# Patient Record
Sex: FEMALE | Race: WHITE | Employment: OTHER | URBAN - NONMETROPOLITAN AREA
[De-identification: names, ages, dates, MRNs, and addresses within clinical notes are randomized per-mention and may not be internally consistent; named-entity substitution may affect disease eponyms.]

---

## 2017-04-17 LAB
CHOLESTEROL, TOTAL: 140 MG/DL
CHOLESTEROL/HDL RATIO: 3.3
HDLC SERPL-MCNC: 43 MG/DL (ref 35–70)
LDL CHOLESTEROL CALCULATED: 55.6 MG/DL (ref 0–160)
TRIGL SERPL-MCNC: 207 MG/DL
VLDLC SERPL CALC-MCNC: 41 MG/DL

## 2017-05-30 RX ORDER — SALSALATE 500 MG
500 TABLET ORAL 2 TIMES DAILY
Qty: 60 TABLET | Refills: 5 | Status: SHIPPED | OUTPATIENT
Start: 2017-05-30 | End: 2017-12-04 | Stop reason: SDUPTHER

## 2017-05-30 RX ORDER — SALSALATE 500 MG
500 TABLET ORAL 2 TIMES DAILY
COMMUNITY
End: 2017-05-30 | Stop reason: SDUPTHER

## 2017-07-24 LAB
AGE FOR GFR: 84
ANION GAP SERPL CALCULATED.3IONS-SCNC: 13 MMOL/L
CHLORIDE BLD-SCNC: 104 MMOL/L
CO2: 27 MMOL/L
CREAT SERPL-MCNC: 1 MG/DL
EGFR BF: 64 ML/MIN/1.73 M2
EGFR BM: 86 ML/MIN/1.73 M2
EGFR WF: 53 ML/MIN/1.73 M2
EGFR WM: 71 ML/MIN/1.73 M2
POTASSIUM SERPL-SCNC: 4.4 MMOL/L
SODIUM BLD-SCNC: 140 MMOL/L

## 2017-07-24 RX ORDER — LISINOPRIL 10 MG/1
10 TABLET ORAL DAILY
Qty: 30 TABLET | Refills: 5 | Status: SHIPPED | OUTPATIENT
Start: 2017-07-24 | End: 2017-10-24 | Stop reason: SDUPTHER

## 2017-07-24 RX ORDER — LISINOPRIL 10 MG/1
10 TABLET ORAL DAILY
COMMUNITY
End: 2017-07-24 | Stop reason: SDUPTHER

## 2017-07-25 LAB — TSH SERPL DL<=0.05 MIU/L-ACNC: 0.19 MIU/ML

## 2017-07-31 RX ORDER — SIMVASTATIN 40 MG
40 TABLET ORAL NIGHTLY
Qty: 90 TABLET | Refills: 3 | Status: SHIPPED | OUTPATIENT
Start: 2017-07-31 | End: 2018-07-28 | Stop reason: SDUPTHER

## 2017-09-12 RX ORDER — OMEPRAZOLE 20 MG/1
20 CAPSULE, DELAYED RELEASE ORAL DAILY
COMMUNITY
End: 2017-09-12 | Stop reason: SDUPTHER

## 2017-09-12 RX ORDER — OMEPRAZOLE 20 MG/1
20 CAPSULE, DELAYED RELEASE ORAL DAILY
Qty: 90 CAPSULE | Refills: 1 | Status: SHIPPED | OUTPATIENT
Start: 2017-09-12 | End: 2018-03-11 | Stop reason: SDUPTHER

## 2017-09-22 PROBLEM — C91.10 CLL (CHRONIC LYMPHOCYTIC LEUKEMIA) (HCC): Status: ACTIVE | Noted: 2017-09-22

## 2017-09-22 PROBLEM — D80.1 HYPOGAMMAGLOBULINEMIA (HCC): Status: ACTIVE | Noted: 2017-09-22

## 2017-10-17 RX ORDER — LEVOTHYROXINE SODIUM 88 UG/1
88 TABLET ORAL DAILY
COMMUNITY
End: 2017-10-17 | Stop reason: SDUPTHER

## 2017-10-17 RX ORDER — LEVOTHYROXINE SODIUM 88 UG/1
88 TABLET ORAL DAILY
Qty: 90 TABLET | Refills: 0 | Status: SHIPPED | OUTPATIENT
Start: 2017-10-17 | End: 2018-01-10 | Stop reason: SDUPTHER

## 2017-10-17 NOTE — TELEPHONE ENCOUNTER
Mike Serrano is calling to request a refill on the following medication(s):  Requested Prescriptions     Pending Prescriptions Disp Refills    levothyroxine (SYNTHROID) 88 MCG tablet 90 tablet 1     Sig: Take 1 tablet by mouth Daily       Last Visit Date (If Applicable):  Visit date not found    Next Visit Date:    10/24/2017

## 2017-10-18 VITALS
BODY MASS INDEX: 31.07 KG/M2 | SYSTOLIC BLOOD PRESSURE: 126 MMHG | HEART RATE: 88 BPM | WEIGHT: 148 LBS | HEIGHT: 58 IN | DIASTOLIC BLOOD PRESSURE: 64 MMHG

## 2017-10-18 DIAGNOSIS — I70.90 ATHEROSCLEROSIS: ICD-10-CM

## 2017-10-18 DIAGNOSIS — E78.6: ICD-10-CM

## 2017-10-18 DIAGNOSIS — E78.49 OTHER HYPERLIPIDEMIA: ICD-10-CM

## 2017-10-18 DIAGNOSIS — I10 BENIGN ESSENTIAL HYPERTENSION: ICD-10-CM

## 2017-10-18 PROBLEM — K57.92 DIVERTICULITIS: Status: ACTIVE | Noted: 2017-10-18

## 2017-10-18 PROBLEM — M81.0 OSTEOPOROSIS: Status: ACTIVE | Noted: 2017-10-18

## 2017-10-18 PROBLEM — E03.9 HYPOTHYROIDISM: Status: ACTIVE | Noted: 2017-10-18

## 2017-10-18 PROBLEM — I65.29 CAROTID STENOSIS: Status: ACTIVE | Noted: 2017-10-18

## 2017-10-18 RX ORDER — SERTRALINE HYDROCHLORIDE 25 MG/1
25 TABLET, FILM COATED ORAL DAILY
COMMUNITY
End: 2021-03-03

## 2017-10-18 RX ORDER — DOCUSATE SODIUM 100 MG/1
100 CAPSULE, LIQUID FILLED ORAL DAILY
COMMUNITY

## 2017-10-18 RX ORDER — FERROUS SULFATE 325(65) MG
325 TABLET ORAL 2 TIMES DAILY
COMMUNITY
End: 2018-03-05 | Stop reason: SDUPTHER

## 2017-10-20 LAB
A/G RATIO: 1.2 RATIO
AGE FOR GFR: 84
ALBUMIN: 4.4 G/DL
ALK PHOSPHATASE: 66 UNITS/L
ALT SERPL-CCNC: 31 UNITS/L
ANION GAP SERPL CALCULATED.3IONS-SCNC: 15 MMOL/L
AST SERPL-CCNC: 26 UNITS/L
BILIRUB SERPL-MCNC: 0.4 MG/DL
BILIRUBIN DIRECT: 0 MG/DL
CHLORIDE BLD-SCNC: 104 MMOL/L
CHOLESTEROL/HDL RATIO: 3.7 RATIO
CHOLESTEROL: 140 MG/DL
CO2: 28 MMOL/L
CREAT SERPL-MCNC: 1 MG/DL
EGFR BF: 64 ML/MIN/1.73 M2
EGFR BM: 86 ML/MIN/1.73 M2
EGFR WF: 53 ML/MIN/1.73 M2
EGFR WM: 71 ML/MIN/1.73 M2
GLOBULIN: 3.7 G/DL
HDL, DIRECT: 38 MG/DL
LDL CHOLESTEROL CALCULATED: 59.8 MG/DL
POTASSIUM SERPL-SCNC: 3.8 MMOL/L
SODIUM BLD-SCNC: 143 MMOL/L
TOTAL PROTEIN: 8.1 G/DL
TRIGL SERPL-MCNC: 211 MG/DL
TSH SERPL DL<=0.05 MIU/L-ACNC: 0.71 MIU/ML
VLDLC SERPL CALC-MCNC: 42 MG/DL

## 2017-10-24 ENCOUNTER — OFFICE VISIT (OUTPATIENT)
Dept: FAMILY MEDICINE CLINIC | Age: 82
End: 2017-10-24
Payer: MEDICARE

## 2017-10-24 VITALS
WEIGHT: 160 LBS | SYSTOLIC BLOOD PRESSURE: 128 MMHG | HEART RATE: 64 BPM | BODY MASS INDEX: 33.58 KG/M2 | HEIGHT: 58 IN | DIASTOLIC BLOOD PRESSURE: 62 MMHG

## 2017-10-24 DIAGNOSIS — Z23 NEED FOR PROPHYLACTIC VACCINATION AND INOCULATION AGAINST INFLUENZA: ICD-10-CM

## 2017-10-24 DIAGNOSIS — I10 ESSENTIAL HYPERTENSION: Primary | ICD-10-CM

## 2017-10-24 DIAGNOSIS — C91.10 CLL (CHRONIC LYMPHOCYTIC LEUKEMIA) (HCC): ICD-10-CM

## 2017-10-24 PROCEDURE — 90662 IIV NO PRSV INCREASED AG IM: CPT | Performed by: FAMILY MEDICINE

## 2017-10-24 PROCEDURE — G8484 FLU IMMUNIZE NO ADMIN: HCPCS | Performed by: FAMILY MEDICINE

## 2017-10-24 PROCEDURE — G8400 PT W/DXA NO RESULTS DOC: HCPCS | Performed by: FAMILY MEDICINE

## 2017-10-24 PROCEDURE — G0008 ADMIN INFLUENZA VIRUS VAC: HCPCS | Performed by: FAMILY MEDICINE

## 2017-10-24 PROCEDURE — 1036F TOBACCO NON-USER: CPT | Performed by: FAMILY MEDICINE

## 2017-10-24 PROCEDURE — G8417 CALC BMI ABV UP PARAM F/U: HCPCS | Performed by: FAMILY MEDICINE

## 2017-10-24 PROCEDURE — G8599 NO ASA/ANTIPLAT THER USE RNG: HCPCS | Performed by: FAMILY MEDICINE

## 2017-10-24 PROCEDURE — 1123F ACP DISCUSS/DSCN MKR DOCD: CPT | Performed by: FAMILY MEDICINE

## 2017-10-24 PROCEDURE — 4040F PNEUMOC VAC/ADMIN/RCVD: CPT | Performed by: FAMILY MEDICINE

## 2017-10-24 PROCEDURE — 1090F PRES/ABSN URINE INCON ASSESS: CPT | Performed by: FAMILY MEDICINE

## 2017-10-24 PROCEDURE — 99213 OFFICE O/P EST LOW 20 MIN: CPT | Performed by: FAMILY MEDICINE

## 2017-10-24 PROCEDURE — G8427 DOCREV CUR MEDS BY ELIG CLIN: HCPCS | Performed by: FAMILY MEDICINE

## 2017-10-24 RX ORDER — LISINOPRIL 10 MG/1
5 TABLET ORAL DAILY
Qty: 30 TABLET | Refills: 0
Start: 2017-10-24 | End: 2018-02-01 | Stop reason: SDUPTHER

## 2017-10-24 ASSESSMENT — ENCOUNTER SYMPTOMS
BACK PAIN: 1
SHORTNESS OF BREATH: 0

## 2017-10-24 NOTE — PROGRESS NOTES
influenza    2. Essential hypertension    3. CLL (chronic lymphocytic leukemia) ([facility] [address]                     Plan:     There are no Patient Instructions on file for this visit. Orders Placed This Encounter   Procedures    INFLUENZA, HIGH DOSE, 65 YRS +, IM, PF, PREFILL SYR, 0.5ML (FLUZONE HD)     No orders of the defined types were placed in this encounter. May use either 2-3 tabs of disalcid daily as needed    Return in about 6 months (around 4/24/2018) for HTN. Discussed use, benefit, and side effects of prescribed medications. All patient questions answered. Pt voiced understanding. Instructed to continue current medications, diet and exercise. Patient agreed with treatment plan. Follow up as directed.      Electronically signed by Albertina Peralta MD on 10/24/2017

## 2017-12-04 RX ORDER — SALSALATE 500 MG
500 TABLET ORAL 2 TIMES DAILY
Qty: 60 TABLET | Refills: 5 | Status: SHIPPED | OUTPATIENT
Start: 2017-12-04 | End: 2018-06-04 | Stop reason: SDUPTHER

## 2017-12-13 ENCOUNTER — OFFICE VISIT (OUTPATIENT)
Dept: FAMILY MEDICINE CLINIC | Age: 82
End: 2017-12-13
Payer: MEDICARE

## 2017-12-13 VITALS
DIASTOLIC BLOOD PRESSURE: 70 MMHG | WEIGHT: 156 LBS | HEART RATE: 80 BPM | BODY MASS INDEX: 32.89 KG/M2 | SYSTOLIC BLOOD PRESSURE: 130 MMHG

## 2017-12-13 DIAGNOSIS — M54.50 ACUTE RIGHT-SIDED LOW BACK PAIN WITHOUT SCIATICA: Primary | ICD-10-CM

## 2017-12-13 PROCEDURE — 4040F PNEUMOC VAC/ADMIN/RCVD: CPT | Performed by: FAMILY MEDICINE

## 2017-12-13 PROCEDURE — 1090F PRES/ABSN URINE INCON ASSESS: CPT | Performed by: FAMILY MEDICINE

## 2017-12-13 PROCEDURE — G8427 DOCREV CUR MEDS BY ELIG CLIN: HCPCS | Performed by: FAMILY MEDICINE

## 2017-12-13 PROCEDURE — G8484 FLU IMMUNIZE NO ADMIN: HCPCS | Performed by: FAMILY MEDICINE

## 2017-12-13 PROCEDURE — 1123F ACP DISCUSS/DSCN MKR DOCD: CPT | Performed by: FAMILY MEDICINE

## 2017-12-13 PROCEDURE — G8599 NO ASA/ANTIPLAT THER USE RNG: HCPCS | Performed by: FAMILY MEDICINE

## 2017-12-13 PROCEDURE — 1036F TOBACCO NON-USER: CPT | Performed by: FAMILY MEDICINE

## 2017-12-13 PROCEDURE — G8417 CALC BMI ABV UP PARAM F/U: HCPCS | Performed by: FAMILY MEDICINE

## 2017-12-13 PROCEDURE — 99213 OFFICE O/P EST LOW 20 MIN: CPT | Performed by: FAMILY MEDICINE

## 2017-12-13 PROCEDURE — G8400 PT W/DXA NO RESULTS DOC: HCPCS | Performed by: FAMILY MEDICINE

## 2017-12-13 ASSESSMENT — PATIENT HEALTH QUESTIONNAIRE - PHQ9
1. LITTLE INTEREST OR PLEASURE IN DOING THINGS: 0
2. FEELING DOWN, DEPRESSED OR HOPELESS: 0
SUM OF ALL RESPONSES TO PHQ9 QUESTIONS 1 & 2: 0
SUM OF ALL RESPONSES TO PHQ QUESTIONS 1-9: 0

## 2017-12-13 ASSESSMENT — ENCOUNTER SYMPTOMS: BACK PAIN: 1

## 2017-12-13 NOTE — PROGRESS NOTES
mouth nightly 90 tablet 3     No current facility-administered medications for this visit. No Known Allergies    Health Maintenance   Topic Date Due    DTaP/Tdap/Td vaccine (1 - Tdap) 06/08/1952    DEXA (modify frequency per FRAX score)  Completed    Flu vaccine  Completed    Pneumococcal high/highest risk  Completed       Subjective:      Review of Systems   Musculoskeletal: Positive for back pain (rt side low back pain, fell last week of September and when she fell she hit on her right side spilling a cigarette trash can over  has had pain in back since then worse past couple weeks). Neurological: Negative for weakness and numbness. taking OTC back and body and salsalate tid  Didn't tell anyone she fell when it happened    Objective:     /70   Pulse 80   Wt 156 lb (70.8 kg)   BMI 32.89 kg/m²     Physical Exam   Constitutional: She is oriented to person, place, and time. She appears well-developed and well-nourished. No distress. Cardiovascular: Normal rate. No murmur heard. Pulmonary/Chest: Effort normal. She has no wheezes. Musculoskeletal: She exhibits no edema. Cervical back: Normal.        Thoracic back: Normal.        Lumbar back: She exhibits tenderness (minimal bilateral tenderness paravertebral). Neurological: She is alert and oriented to person, place, and time. No sciatic notch tenderness       Assessment:     1. Acute right-sided low back pain without sciatica      No results found for this visit on 12/13/17.         Plan:     Orders Placed This Encounter   Procedures    XR THORACIC SPINE (3 VIEWS)     Standing Status:   Future     Standing Expiration Date:   12/13/2018     Order Specific Question:   Reason for exam:     Answer:   right side back pain    XR LUMBAR SPINE (2-3 VIEWS)     Standing Status:   Future     Standing Expiration Date:   12/13/2018     Order Specific Question:   Reason for exam:     Answer:   right side mid to low back pain   [facility] - [place]     Referral Priority:   Routine     Referral Type:   Eval and Treat     Referral Reason:   Specialty Services Required     Referral Location:   [address] [place] [address] Specialty:   Physical Therapy     Number of Visits Requested:   1       No orders of the defined types were placed in this encounter. Advised to contact sooner if another fall occurs  No Follow-up on file. Discussed use, benefit, and side effects of prescribed medications. All patient questions answered. Pt voiced understanding. Instructed to continue current medications, diet and exercise. Patient agreed with treatment plan. Follow up as directed.      Electronically signed by Phuong Lei MD on 12/13/2017

## 2018-01-10 RX ORDER — LEVOTHYROXINE SODIUM 88 UG/1
TABLET ORAL
Qty: 30 TABLET | Refills: 0 | Status: SHIPPED | OUTPATIENT
Start: 2018-01-10 | End: 2018-02-12 | Stop reason: SDUPTHER

## 2018-02-01 DIAGNOSIS — C91.10 CLL (CHRONIC LYMPHOCYTIC LEUKEMIA) (HCC): ICD-10-CM

## 2018-02-01 DIAGNOSIS — E03.9 ACQUIRED HYPOTHYROIDISM: ICD-10-CM

## 2018-02-01 DIAGNOSIS — I10 ESSENTIAL HYPERTENSION: Primary | ICD-10-CM

## 2018-02-01 RX ORDER — LISINOPRIL 5 MG/1
5 TABLET ORAL DAILY
Qty: 90 TABLET | Refills: 1 | Status: SHIPPED | OUTPATIENT
Start: 2018-02-01 | End: 2018-07-27 | Stop reason: SDUPTHER

## 2018-02-12 RX ORDER — LEVOTHYROXINE SODIUM 88 UG/1
TABLET ORAL
Qty: 30 TABLET | Refills: 5 | Status: SHIPPED | OUTPATIENT
Start: 2018-02-12 | End: 2018-04-24 | Stop reason: SDUPTHER

## 2018-03-05 RX ORDER — FERROUS SULFATE 325(65) MG
325 TABLET ORAL 2 TIMES DAILY
Qty: 30 TABLET | Refills: 5 | Status: SHIPPED | OUTPATIENT
Start: 2018-03-05 | End: 2019-12-17 | Stop reason: SDUPTHER

## 2018-03-05 NOTE — TELEPHONE ENCOUNTER
[employer] is calling to request a refill on the following medication(s):  Requested Prescriptions     Pending Prescriptions Disp Refills    ferrous sulfate 325 (65 Fe) MG tablet 30 tablet 5     Sig: Take 1 tablet by mouth 2 times daily       Last Visit Date (If Applicable):  03/14/2474    Next Visit Date:    4/24/2018

## 2018-03-12 RX ORDER — OMEPRAZOLE 20 MG/1
CAPSULE, DELAYED RELEASE ORAL
Qty: 90 CAPSULE | Refills: 1 | Status: SHIPPED | OUTPATIENT
Start: 2018-03-12 | End: 2018-09-08 | Stop reason: SDUPTHER

## 2018-04-09 ENCOUNTER — OFFICE VISIT (OUTPATIENT)
Dept: FAMILY MEDICINE CLINIC | Age: 83
End: 2018-04-09
Payer: MEDICARE

## 2018-04-09 VITALS
BODY MASS INDEX: 33.52 KG/M2 | HEART RATE: 72 BPM | SYSTOLIC BLOOD PRESSURE: 180 MMHG | DIASTOLIC BLOOD PRESSURE: 80 MMHG | TEMPERATURE: 97.9 F | WEIGHT: 159 LBS

## 2018-04-09 DIAGNOSIS — R05.9 COUGH: Primary | ICD-10-CM

## 2018-04-09 DIAGNOSIS — D80.1 HYPOGAMMAGLOBULINEMIA (HCC): ICD-10-CM

## 2018-04-09 PROCEDURE — G8417 CALC BMI ABV UP PARAM F/U: HCPCS | Performed by: FAMILY MEDICINE

## 2018-04-09 PROCEDURE — 1036F TOBACCO NON-USER: CPT | Performed by: FAMILY MEDICINE

## 2018-04-09 PROCEDURE — 4040F PNEUMOC VAC/ADMIN/RCVD: CPT | Performed by: FAMILY MEDICINE

## 2018-04-09 PROCEDURE — 1090F PRES/ABSN URINE INCON ASSESS: CPT | Performed by: FAMILY MEDICINE

## 2018-04-09 PROCEDURE — 1123F ACP DISCUSS/DSCN MKR DOCD: CPT | Performed by: FAMILY MEDICINE

## 2018-04-09 PROCEDURE — 99213 OFFICE O/P EST LOW 20 MIN: CPT | Performed by: FAMILY MEDICINE

## 2018-04-09 PROCEDURE — G8400 PT W/DXA NO RESULTS DOC: HCPCS | Performed by: FAMILY MEDICINE

## 2018-04-09 PROCEDURE — G8427 DOCREV CUR MEDS BY ELIG CLIN: HCPCS | Performed by: FAMILY MEDICINE

## 2018-04-09 PROCEDURE — G8599 NO ASA/ANTIPLAT THER USE RNG: HCPCS | Performed by: FAMILY MEDICINE

## 2018-04-09 RX ORDER — BENZONATATE 100 MG/1
100 CAPSULE ORAL 3 TIMES DAILY PRN
Qty: 30 CAPSULE | Refills: 0 | Status: SHIPPED | OUTPATIENT
Start: 2018-04-09 | End: 2018-04-16

## 2018-04-09 RX ORDER — AZITHROMYCIN 250 MG/1
TABLET, FILM COATED ORAL
Qty: 1 PACKET | Refills: 0 | Status: SHIPPED | OUTPATIENT
Start: 2018-04-09 | End: 2018-04-19

## 2018-04-09 ASSESSMENT — ENCOUNTER SYMPTOMS
SHORTNESS OF BREATH: 1
WHEEZING: 1
COUGH: 1

## 2018-04-21 ENCOUNTER — TELEPHONE (OUTPATIENT)
Dept: FAMILY MEDICINE CLINIC | Age: 83
End: 2018-04-21

## 2018-04-21 LAB
AGE FOR GFR: 84
ANION GAP SERPL CALCULATED.3IONS-SCNC: 18 MMOL/L
ANISOCYTOSIS: ABNORMAL
BANDS: 0 %
BASOPHILS # BLD: 0 %
CHLORIDE BLD-SCNC: 102 MMOL/L
CO2: 28 MMOL/L
CREAT SERPL-MCNC: 1 MG/DL
DIFFERENTIAL: MANUAL DIFF
EGFR BF: 64 ML/MIN/1.73 M2
EGFR BM: 86 ML/MIN/1.73 M2
EGFR WF: 53 ML/MIN/1.73 M2
EGFR WM: 71 ML/MIN/1.73 M2
EOSINOPHIL # BLD: 2 %
HCT VFR BLD CALC: 38.9 %
HEMOGLOBIN: 13.2 G/DL
LYMPHOCYTES # BLD: 65 %
LYMPHOCYTES VARIANT: 6 %
MCH RBC QN AUTO: 30.6 PG
MCHC RBC AUTO-ENTMCNC: 34 G/DL
MCV RBC AUTO: 90.2 FL
MONOCYTES: 4 %
MORPHOLOGY: ABNORMAL
NEUTROPHILS: 23 %
PDW BLD-RTO: 12.1 %
PLATELET # BLD: 172 THOU/MM3
PMV BLD AUTO: 6.9 FL
POTASSIUM SERPL-SCNC: 5.3 MMOL/L
RBC # BLD: 4.31 M/UL
SODIUM BLD-SCNC: 143 MMOL/L
TSH SERPL DL<=0.05 MIU/L-ACNC: 2.54 MIU/ML
WBC # BLD: 23.2 THOU/ML3

## 2018-04-24 ENCOUNTER — OFFICE VISIT (OUTPATIENT)
Dept: FAMILY MEDICINE CLINIC | Age: 83
End: 2018-04-24
Payer: MEDICARE

## 2018-04-24 VITALS
BODY MASS INDEX: 33.37 KG/M2 | DIASTOLIC BLOOD PRESSURE: 68 MMHG | HEIGHT: 58 IN | WEIGHT: 159 LBS | SYSTOLIC BLOOD PRESSURE: 132 MMHG | HEART RATE: 80 BPM

## 2018-04-24 DIAGNOSIS — C91.10 CLL (CHRONIC LYMPHOCYTIC LEUKEMIA) (HCC): ICD-10-CM

## 2018-04-24 DIAGNOSIS — E03.9 ACQUIRED HYPOTHYROIDISM: Primary | ICD-10-CM

## 2018-04-24 DIAGNOSIS — Z23 NEED FOR PROPHYLACTIC VACCINATION AND INOCULATION AGAINST VARICELLA: ICD-10-CM

## 2018-04-24 DIAGNOSIS — Z91.81 AT HIGH RISK FOR FALLS: ICD-10-CM

## 2018-04-24 DIAGNOSIS — I10 ESSENTIAL HYPERTENSION: ICD-10-CM

## 2018-04-24 PROCEDURE — 1090F PRES/ABSN URINE INCON ASSESS: CPT | Performed by: FAMILY MEDICINE

## 2018-04-24 PROCEDURE — 99214 OFFICE O/P EST MOD 30 MIN: CPT | Performed by: FAMILY MEDICINE

## 2018-04-24 PROCEDURE — G8417 CALC BMI ABV UP PARAM F/U: HCPCS | Performed by: FAMILY MEDICINE

## 2018-04-24 PROCEDURE — 1036F TOBACCO NON-USER: CPT | Performed by: FAMILY MEDICINE

## 2018-04-24 PROCEDURE — G8599 NO ASA/ANTIPLAT THER USE RNG: HCPCS | Performed by: FAMILY MEDICINE

## 2018-04-24 PROCEDURE — 4040F PNEUMOC VAC/ADMIN/RCVD: CPT | Performed by: FAMILY MEDICINE

## 2018-04-24 PROCEDURE — G8400 PT W/DXA NO RESULTS DOC: HCPCS | Performed by: FAMILY MEDICINE

## 2018-04-24 PROCEDURE — 1123F ACP DISCUSS/DSCN MKR DOCD: CPT | Performed by: FAMILY MEDICINE

## 2018-04-24 PROCEDURE — G8427 DOCREV CUR MEDS BY ELIG CLIN: HCPCS | Performed by: FAMILY MEDICINE

## 2018-04-24 RX ORDER — LEVOTHYROXINE SODIUM 0.1 MG/1
TABLET ORAL
Qty: 90 TABLET | Refills: 1 | Status: SHIPPED | OUTPATIENT
Start: 2018-04-24 | End: 2018-10-20 | Stop reason: SDUPTHER

## 2018-04-24 ASSESSMENT — ENCOUNTER SYMPTOMS: SHORTNESS OF BREATH: 0

## 2018-05-08 ENCOUNTER — NURSE ONLY (OUTPATIENT)
Dept: FAMILY MEDICINE CLINIC | Age: 83
End: 2018-05-08

## 2018-05-08 VITALS — DIASTOLIC BLOOD PRESSURE: 70 MMHG | SYSTOLIC BLOOD PRESSURE: 140 MMHG

## 2018-05-10 ENCOUNTER — NURSE ONLY (OUTPATIENT)
Dept: FAMILY MEDICINE CLINIC | Age: 83
End: 2018-05-10

## 2018-05-10 VITALS — DIASTOLIC BLOOD PRESSURE: 80 MMHG | SYSTOLIC BLOOD PRESSURE: 150 MMHG | HEART RATE: 72 BPM

## 2018-06-04 RX ORDER — SALSALATE 500 MG
500 TABLET ORAL 2 TIMES DAILY
Qty: 60 TABLET | Refills: 5 | Status: SHIPPED | OUTPATIENT
Start: 2018-06-04 | End: 2018-12-21 | Stop reason: SDUPTHER

## 2018-07-27 RX ORDER — LISINOPRIL 5 MG/1
TABLET ORAL
Qty: 90 TABLET | Refills: 1 | Status: SHIPPED | OUTPATIENT
Start: 2018-07-27 | End: 2019-01-28 | Stop reason: SDUPTHER

## 2018-07-27 NOTE — TELEPHONE ENCOUNTER
[facility]  is calling to request a refill on the following medication(s):  Requested Prescriptions     Pending Prescriptions Disp Refills    lisinopril (PRINIVIL;ZESTRIL) 5 MG tablet [Pharmacy Med Name: LISINOPRIL 5MG      TAB] 90 tablet 1     Sig: TAKE ONE TABLET BY MOUTH ONCE DAILY       Last Visit Date (If Applicable):  2/00/4244    Next Visit Date:    8/23/2018

## 2018-07-30 RX ORDER — SIMVASTATIN 40 MG
TABLET ORAL
Qty: 90 TABLET | Refills: 3 | Status: SHIPPED | OUTPATIENT
Start: 2018-07-30 | End: 2019-01-08 | Stop reason: SDUPTHER

## 2018-08-07 LAB
ALBUMIN SERPL-MCNC: 4.2 G/DL
ALP BLD-CCNC: 74 U/L
ALT SERPL-CCNC: 29 U/L
ANION GAP SERPL CALCULATED.3IONS-SCNC: 15 MMOL/L
AST SERPL-CCNC: 26 U/L
BILIRUB SERPL-MCNC: 6.8 MG/DL (ref 0.1–1.4)
BUN BLDV-MCNC: 23 MG/DL
CALCIUM SERPL-MCNC: 9.6 MG/DL
CHLORIDE BLD-SCNC: 104 MMOL/L
CO2: 27 MMOL/L
CREAT SERPL-MCNC: 1 MG/DL
GFR CALCULATED: 53
GLUCOSE BLD-MCNC: 83 MG/DL
POTASSIUM SERPL-SCNC: 4.7 MMOL/L
SODIUM BLD-SCNC: 141 MMOL/L
TOTAL PROTEIN: 6.8

## 2018-08-21 LAB
AGE FOR GFR: 85
ANION GAP SERPL CALCULATED.3IONS-SCNC: 11 MMOL/L
CHLORIDE BLD-SCNC: 109 MMOL/L
CO2: 26 MMOL/L
CREAT SERPL-MCNC: 0.9 MG/DL
CREATININE, RANDOM: 295 MG/DL
EGFR BF: 72 ML/MIN/1.73 M2
EGFR BM: 97 ML/MIN/1.73 M2
EGFR WF: 60 ML/MIN/1.73 M2
EGFR WM: 80 ML/MIN/1.73 M2
MICROALBUMIN UR-MCNC: 8.4 MG/DL
MICROALBUMIN/CREAT UR-RTO: 28.5 MCG/MG CR
POTASSIUM SERPL-SCNC: 4.3 MMOL/L
SODIUM BLD-SCNC: 142 MMOL/L
TSH SERPL DL<=0.05 MIU/L-ACNC: 2.06 MIU/ML

## 2018-08-23 ENCOUNTER — OFFICE VISIT (OUTPATIENT)
Dept: FAMILY MEDICINE CLINIC | Age: 83
End: 2018-08-23
Payer: MEDICARE

## 2018-08-23 VITALS
HEART RATE: 80 BPM | DIASTOLIC BLOOD PRESSURE: 72 MMHG | SYSTOLIC BLOOD PRESSURE: 130 MMHG | HEIGHT: 58 IN | BODY MASS INDEX: 32.32 KG/M2 | WEIGHT: 154 LBS

## 2018-08-23 DIAGNOSIS — C91.10 CLL (CHRONIC LYMPHOCYTIC LEUKEMIA) (HCC): ICD-10-CM

## 2018-08-23 DIAGNOSIS — E03.9 ACQUIRED HYPOTHYROIDISM: Primary | ICD-10-CM

## 2018-08-23 DIAGNOSIS — I10 ESSENTIAL HYPERTENSION: ICD-10-CM

## 2018-08-23 PROCEDURE — 1090F PRES/ABSN URINE INCON ASSESS: CPT | Performed by: FAMILY MEDICINE

## 2018-08-23 PROCEDURE — G8417 CALC BMI ABV UP PARAM F/U: HCPCS | Performed by: FAMILY MEDICINE

## 2018-08-23 PROCEDURE — 99214 OFFICE O/P EST MOD 30 MIN: CPT | Performed by: FAMILY MEDICINE

## 2018-08-23 PROCEDURE — G8599 NO ASA/ANTIPLAT THER USE RNG: HCPCS | Performed by: FAMILY MEDICINE

## 2018-08-23 PROCEDURE — 1123F ACP DISCUSS/DSCN MKR DOCD: CPT | Performed by: FAMILY MEDICINE

## 2018-08-23 PROCEDURE — 4040F PNEUMOC VAC/ADMIN/RCVD: CPT | Performed by: FAMILY MEDICINE

## 2018-08-23 PROCEDURE — G8400 PT W/DXA NO RESULTS DOC: HCPCS | Performed by: FAMILY MEDICINE

## 2018-08-23 PROCEDURE — G8427 DOCREV CUR MEDS BY ELIG CLIN: HCPCS | Performed by: FAMILY MEDICINE

## 2018-08-23 PROCEDURE — 1036F TOBACCO NON-USER: CPT | Performed by: FAMILY MEDICINE

## 2018-08-23 PROCEDURE — 1101F PT FALLS ASSESS-DOCD LE1/YR: CPT | Performed by: FAMILY MEDICINE

## 2018-08-23 ASSESSMENT — ENCOUNTER SYMPTOMS: SHORTNESS OF BREATH: 0

## 2018-08-23 NOTE — PROGRESS NOTES
[address]  Dept: [phone]  Dept Fax: [phone]    Anurag Rodriguez is a 80 y.o. female who presents today for her medical conditions/complaints as noted below. Anurag Rodrigeuz is c/o of 3 Month Follow-Up;  Hypertension; and Thyroid Problem            HPI:     HPI   Here for follow up of CLL, HTN and Hypothyroid  Taking all medications regularly  No side effects noted    No other complaint currently      BP Readings from Last 3 Encounters:   08/23/18 130/72   05/10/18 (!) 150/80   05/08/18 (!) 140/70          (goal 120/80)    Past Medical History:   Diagnosis Date    Osteopenia       Past Surgical History:   Procedure Laterality Date    CATARACT REMOVAL      GALLBLADDER SURGERY      HYSTERECTOMY, VAGINAL         Family History   Problem Relation Age of Onset    Stroke Mother     Stroke Father        Social History   Substance Use Topics    Smoking status: Never Smoker    Smokeless tobacco: Never Used    Alcohol use No      Current Outpatient Prescriptions   Medication Sig Dispense Refill    simvastatin (ZOCOR) 40 MG tablet TAKE 1 TABLET NIGHTLY 90 tablet 3    lisinopril (PRINIVIL;ZESTRIL) 5 MG tablet TAKE ONE TABLET BY MOUTH ONCE DAILY 90 tablet 1    salsalate (DISALCID) 500 MG tablet Take 1 tablet by mouth 2 times daily 60 tablet 5    levothyroxine (SYNTHROID) 100 MCG tablet TAKE ONE TABLET BY MOUTH ONCE DAILY 90 tablet 1    omeprazole (PRILOSEC) 20 MG delayed release capsule TAKE 1 CAPSULE DAILY 90 capsule 1    ferrous sulfate 325 (65 Fe) MG tablet Take 1 tablet by mouth 2 times daily 30 tablet 5    Inulin (FIBER CHOICE PO) Take by mouth      Multiple Vitamins-Minerals (MULTIVITAMIN ADULT PO) Take by mouth 2 times daily      docusate sodium (DULCOLAX) 100 MG capsule Take 100 mg by mouth daily      Calcium Carb-Cholecalciferol (CALTRATE 600+D) 600-800 MG-UNIT TABS Take by mouth 2 times daily      sertraline (ZOLOFT) 25 MG tablet Take 25 mg by mouth daily       No current facility-administered medications for this visit. No Known Allergies    Health Maintenance   Topic Date Due    DTaP/Tdap/Td vaccine (1 - Tdap) 06[birth year]/1952    Shingles Vaccine (1 of 2 - 2 Dose Series) 06/08/1983    Flu vaccine (1) 09/01/2018    TSH testing  08/21/2019    Potassium monitoring  08/21/2019    Creatinine monitoring  08/21/2019    DEXA (modify frequency per FRAX score)  Completed    Pneumococcal high/highest risk  Completed       Subjective:      Review of Systems   Constitutional: Negative for fatigue. Here for routine follow up without concerns. Respiratory: Negative for shortness of breath. Cardiovascular: Negative for chest pain, palpitations and leg swelling. Genitourinary: Negative for frequency. Neurological: Negative for dizziness. Home BP Checks? no  Medication Compliant? yes    Objective:     /72   Pulse 80   Ht 4' 9.5\" (1.461 m)   Wt 154 lb (69.9 kg)   BMI 32.75 kg/m²   Physical Exam   Constitutional: She is oriented to person, place, and time. She appears well-developed and well-nourished. No distress. HENT:   Head: Atraumatic. Neck: Neck supple. Carotid bruit is not present. No thyromegaly present. Cardiovascular: Normal rate and regular rhythm. No murmur heard. Pulmonary/Chest: Effort normal and breath sounds normal.   Musculoskeletal: She exhibits no edema. Neurological: She is alert and oriented to person, place, and time. Lab Results   Component Value Date    CREATININE 0.9 08/21/2018     Lab Results   Component Value Date     08/21/2018    K 4.3 08/21/2018     08/21/2018    CO2 26 08/21/2018     Lab Results   Component Value Date    TSH 2.06 08/21/2018     microalbumin 28 reviewed form 8/21/18    Assessment:      1. Acquired hypothyroidism    2. Essential hypertension    3.  CLL (chronic lymphocytic leukemia) ([facility] [address]                     Plan: There are no Patient Instructions on file for this visit. No orders of the defined types were placed in this encounter. No orders of the defined types were placed in this encounter. Return in about 4 months (around 12/23/2018) for HTN, thyroid. Discussed use, benefit, and side effects of prescribed medications. All patient questions answered. Pt voiced understanding. Reviewed health maintenance- shingrix reviewed, had initial already at [facility] on order. Instructed to continue current medications, diet and exercise. Patient agreed with treatment plan. Follow up as directed.      Electronically signed by Lindsey Patel MD on 8/23/2018

## 2018-09-10 RX ORDER — OMEPRAZOLE 20 MG/1
CAPSULE, DELAYED RELEASE ORAL
Qty: 90 CAPSULE | Refills: 1 | Status: SHIPPED | OUTPATIENT
Start: 2018-09-10 | End: 2019-04-09 | Stop reason: SDUPTHER

## 2018-10-22 RX ORDER — LEVOTHYROXINE SODIUM 0.1 MG/1
TABLET ORAL
Qty: 90 TABLET | Refills: 1 | Status: SHIPPED | OUTPATIENT
Start: 2018-10-22 | End: 2019-01-08 | Stop reason: SDUPTHER

## 2018-12-19 LAB
AGE FOR GFR: 85
ANION GAP SERPL CALCULATED.3IONS-SCNC: 11 MMOL/L
CHLORIDE BLD-SCNC: 109 MMOL/L
CO2: 25 MMOL/L
CREAT SERPL-MCNC: 0.9 MG/DL
EGFR BF: 72 ML/MIN/1.73 M2
EGFR BM: 97 ML/MIN/1.73 M2
EGFR WF: 60 ML/MIN/1.73 M2
EGFR WM: 80 ML/MIN/1.73 M2
POTASSIUM SERPL-SCNC: 4.3 MMOL/L
SODIUM BLD-SCNC: 141 MMOL/L
TSH SERPL DL<=0.05 MIU/L-ACNC: 0.04 MIU/ML

## 2018-12-21 RX ORDER — SALSALATE 500 MG
500 TABLET ORAL 2 TIMES DAILY
Qty: 60 TABLET | Refills: 5 | Status: SHIPPED | OUTPATIENT
Start: 2018-12-21 | End: 2019-07-11 | Stop reason: ALTCHOICE

## 2018-12-21 NOTE — TELEPHONE ENCOUNTER
Timur Rodriguez is calling to request a refill on the following medication(s):  Requested Prescriptions     Pending Prescriptions Disp Refills    salsalate (DISALCID) 500 MG tablet 60 tablet 5     Sig: Take 1 tablet by mouth 2 times daily       Last Visit Date (If Applicable):  8/00/7784    Next Visit Date:    1/8/2019

## 2019-01-08 ENCOUNTER — OFFICE VISIT (OUTPATIENT)
Dept: FAMILY MEDICINE CLINIC | Age: 84
End: 2019-01-08
Payer: MEDICARE

## 2019-01-08 VITALS
DIASTOLIC BLOOD PRESSURE: 74 MMHG | SYSTOLIC BLOOD PRESSURE: 134 MMHG | WEIGHT: 145 LBS | HEIGHT: 58 IN | BODY MASS INDEX: 30.44 KG/M2 | HEART RATE: 76 BPM

## 2019-01-08 DIAGNOSIS — I10 ESSENTIAL HYPERTENSION: Primary | ICD-10-CM

## 2019-01-08 DIAGNOSIS — E03.9 ACQUIRED HYPOTHYROIDISM: ICD-10-CM

## 2019-01-08 DIAGNOSIS — E78.6: ICD-10-CM

## 2019-01-08 DIAGNOSIS — C91.10 CLL (CHRONIC LYMPHOCYTIC LEUKEMIA) (HCC): ICD-10-CM

## 2019-01-08 DIAGNOSIS — D80.1 HYPOGAMMAGLOBULINEMIA (HCC): ICD-10-CM

## 2019-01-08 PROCEDURE — G8417 CALC BMI ABV UP PARAM F/U: HCPCS | Performed by: FAMILY MEDICINE

## 2019-01-08 PROCEDURE — 4040F PNEUMOC VAC/ADMIN/RCVD: CPT | Performed by: FAMILY MEDICINE

## 2019-01-08 PROCEDURE — 1090F PRES/ABSN URINE INCON ASSESS: CPT | Performed by: FAMILY MEDICINE

## 2019-01-08 PROCEDURE — 1036F TOBACCO NON-USER: CPT | Performed by: FAMILY MEDICINE

## 2019-01-08 PROCEDURE — 1123F ACP DISCUSS/DSCN MKR DOCD: CPT | Performed by: FAMILY MEDICINE

## 2019-01-08 PROCEDURE — 99214 OFFICE O/P EST MOD 30 MIN: CPT | Performed by: FAMILY MEDICINE

## 2019-01-08 PROCEDURE — G8482 FLU IMMUNIZE ORDER/ADMIN: HCPCS | Performed by: FAMILY MEDICINE

## 2019-01-08 PROCEDURE — G8599 NO ASA/ANTIPLAT THER USE RNG: HCPCS | Performed by: FAMILY MEDICINE

## 2019-01-08 PROCEDURE — 1101F PT FALLS ASSESS-DOCD LE1/YR: CPT | Performed by: FAMILY MEDICINE

## 2019-01-08 PROCEDURE — G8400 PT W/DXA NO RESULTS DOC: HCPCS | Performed by: FAMILY MEDICINE

## 2019-01-08 PROCEDURE — G8427 DOCREV CUR MEDS BY ELIG CLIN: HCPCS | Performed by: FAMILY MEDICINE

## 2019-01-08 RX ORDER — LEVOTHYROXINE SODIUM 88 UG/1
88 TABLET ORAL DAILY
Qty: 90 TABLET | Refills: 1 | Status: SHIPPED | OUTPATIENT
Start: 2019-01-08 | End: 2019-07-11 | Stop reason: SDUPTHER

## 2019-01-08 RX ORDER — SIMVASTATIN 40 MG
TABLET ORAL
Qty: 90 TABLET | Refills: 3 | Status: SHIPPED | OUTPATIENT
Start: 2019-01-08 | End: 2020-01-22 | Stop reason: SDUPTHER

## 2019-01-08 ASSESSMENT — PATIENT HEALTH QUESTIONNAIRE - PHQ9
SUM OF ALL RESPONSES TO PHQ9 QUESTIONS 1 & 2: 0
1. LITTLE INTEREST OR PLEASURE IN DOING THINGS: 0
SUM OF ALL RESPONSES TO PHQ QUESTIONS 1-9: 0
2. FEELING DOWN, DEPRESSED OR HOPELESS: 0
SUM OF ALL RESPONSES TO PHQ QUESTIONS 1-9: 0

## 2019-01-08 ASSESSMENT — ENCOUNTER SYMPTOMS
SHORTNESS OF BREATH: 0
COUGH: 1

## 2019-01-28 RX ORDER — LISINOPRIL 5 MG/1
TABLET ORAL
Qty: 90 TABLET | Refills: 1 | Status: SHIPPED | OUTPATIENT
Start: 2019-01-28 | End: 2019-07-11 | Stop reason: SDUPTHER

## 2019-04-09 RX ORDER — OMEPRAZOLE 20 MG/1
CAPSULE, DELAYED RELEASE ORAL
Qty: 90 CAPSULE | Refills: 1 | Status: SHIPPED | OUTPATIENT
Start: 2019-04-09 | End: 2019-10-22 | Stop reason: SDUPTHER

## 2019-04-09 NOTE — TELEPHONE ENCOUNTER
Malika Doll is calling to request a refill on the following medication(s):  Requested Prescriptions     Pending Prescriptions Disp Refills    omeprazole (PRILOSEC) 20 MG delayed release capsule 90 capsule 1       Last Visit Date (If Applicable):  1/6/2925    Next Visit Date:    7/11/2019

## 2019-07-08 LAB — TSH SERPL DL<=0.05 MIU/L-ACNC: 2.23 MIU/ML (ref 0.49–4.6)

## 2019-07-11 ENCOUNTER — OFFICE VISIT (OUTPATIENT)
Dept: FAMILY MEDICINE CLINIC | Age: 84
End: 2019-07-11
Payer: MEDICARE

## 2019-07-11 VITALS
DIASTOLIC BLOOD PRESSURE: 62 MMHG | WEIGHT: 156 LBS | OXYGEN SATURATION: 98 % | BODY MASS INDEX: 32.75 KG/M2 | HEIGHT: 58 IN | HEART RATE: 76 BPM | SYSTOLIC BLOOD PRESSURE: 128 MMHG

## 2019-07-11 DIAGNOSIS — E03.9 ACQUIRED HYPOTHYROIDISM: ICD-10-CM

## 2019-07-11 DIAGNOSIS — I10 ESSENTIAL HYPERTENSION: ICD-10-CM

## 2019-07-11 DIAGNOSIS — G89.29 CHRONIC MIDLINE LOW BACK PAIN WITHOUT SCIATICA: ICD-10-CM

## 2019-07-11 DIAGNOSIS — E03.9 ACQUIRED HYPOTHYROIDISM: Primary | ICD-10-CM

## 2019-07-11 DIAGNOSIS — M54.50 CHRONIC MIDLINE LOW BACK PAIN WITHOUT SCIATICA: ICD-10-CM

## 2019-07-11 PROCEDURE — 1036F TOBACCO NON-USER: CPT | Performed by: FAMILY MEDICINE

## 2019-07-11 PROCEDURE — 1123F ACP DISCUSS/DSCN MKR DOCD: CPT | Performed by: FAMILY MEDICINE

## 2019-07-11 PROCEDURE — G8599 NO ASA/ANTIPLAT THER USE RNG: HCPCS | Performed by: FAMILY MEDICINE

## 2019-07-11 PROCEDURE — 1090F PRES/ABSN URINE INCON ASSESS: CPT | Performed by: FAMILY MEDICINE

## 2019-07-11 PROCEDURE — G8427 DOCREV CUR MEDS BY ELIG CLIN: HCPCS | Performed by: FAMILY MEDICINE

## 2019-07-11 PROCEDURE — G8417 CALC BMI ABV UP PARAM F/U: HCPCS | Performed by: FAMILY MEDICINE

## 2019-07-11 PROCEDURE — 99214 OFFICE O/P EST MOD 30 MIN: CPT | Performed by: FAMILY MEDICINE

## 2019-07-11 PROCEDURE — 4040F PNEUMOC VAC/ADMIN/RCVD: CPT | Performed by: FAMILY MEDICINE

## 2019-07-11 RX ORDER — CELECOXIB 100 MG/1
100 CAPSULE ORAL 2 TIMES DAILY
Qty: 60 CAPSULE | Refills: 5 | Status: SHIPPED | OUTPATIENT
Start: 2019-07-11 | End: 2020-07-14 | Stop reason: SINTOL

## 2019-07-11 RX ORDER — LISINOPRIL 5 MG/1
TABLET ORAL
Qty: 90 TABLET | Refills: 1 | Status: SHIPPED | OUTPATIENT
Start: 2019-07-11 | End: 2020-01-03 | Stop reason: SDUPTHER

## 2019-07-11 RX ORDER — LEVOTHYROXINE SODIUM 88 UG/1
88 TABLET ORAL DAILY
Qty: 90 TABLET | Refills: 1 | Status: CANCELLED | OUTPATIENT
Start: 2019-07-11

## 2019-07-11 RX ORDER — LEVOTHYROXINE SODIUM 88 UG/1
88 TABLET ORAL DAILY
Qty: 90 TABLET | Refills: 1 | Status: SHIPPED | OUTPATIENT
Start: 2019-07-11 | End: 2020-01-02 | Stop reason: SDUPTHER

## 2019-07-11 ASSESSMENT — ENCOUNTER SYMPTOMS: SHORTNESS OF BREATH: 0

## 2019-10-22 ENCOUNTER — NURSE ONLY (OUTPATIENT)
Dept: FAMILY MEDICINE CLINIC | Age: 84
End: 2019-10-22
Payer: MEDICARE

## 2019-10-22 DIAGNOSIS — Z23 NEED FOR INFLUENZA VACCINATION: Primary | ICD-10-CM

## 2019-10-22 PROCEDURE — 90653 IIV ADJUVANT VACCINE IM: CPT | Performed by: FAMILY MEDICINE

## 2019-10-22 PROCEDURE — G0008 ADMIN INFLUENZA VIRUS VAC: HCPCS | Performed by: FAMILY MEDICINE

## 2019-10-22 RX ORDER — OMEPRAZOLE 20 MG/1
CAPSULE, DELAYED RELEASE ORAL
Qty: 90 CAPSULE | Refills: 3 | Status: SHIPPED | OUTPATIENT
Start: 2019-10-22 | End: 2020-10-19

## 2019-12-17 RX ORDER — FERROUS SULFATE 325(65) MG
325 TABLET ORAL 2 TIMES DAILY
Qty: 30 TABLET | Refills: 5 | Status: SHIPPED | OUTPATIENT
Start: 2019-12-17 | End: 2021-01-06 | Stop reason: ALTCHOICE

## 2020-01-02 ENCOUNTER — HOSPITAL ENCOUNTER (OUTPATIENT)
Age: 85
Setting detail: SPECIMEN
Discharge: HOME | End: 2020-01-02
Payer: MEDICARE

## 2020-01-02 ENCOUNTER — OFFICE VISIT (OUTPATIENT)
Dept: FAMILY MEDICINE CLINIC | Age: 85
End: 2020-01-02
Payer: MEDICARE

## 2020-01-02 VITALS
DIASTOLIC BLOOD PRESSURE: 62 MMHG | WEIGHT: 149 LBS | SYSTOLIC BLOOD PRESSURE: 124 MMHG | HEIGHT: 55 IN | OXYGEN SATURATION: 97 % | BODY MASS INDEX: 34.48 KG/M2 | HEART RATE: 77 BPM

## 2020-01-02 LAB
ANION GAP SERPL CALCULATED.3IONS-SCNC: 12 MMOL/L (ref 9–17)
CHLORIDE BLD-SCNC: 104 MMOL/L (ref 98–107)
CHOLESTEROL/HDL RATIO: 3.9
CHOLESTEROL: 112 MG/DL
CO2: 24 MMOL/L (ref 20–31)
CREAT SERPL-MCNC: 0.99 MG/DL (ref 0.5–0.9)
GFR AFRICAN AMERICAN: >60 ML/MIN
GFR NON-AFRICAN AMERICAN: 53 ML/MIN
GFR SERPL CREATININE-BSD FRML MDRD: ABNORMAL ML/MIN/{1.73_M2}
GFR SERPL CREATININE-BSD FRML MDRD: ABNORMAL ML/MIN/{1.73_M2}
HDLC SERPL-MCNC: 29 MG/DL
LDL CHOLESTEROL: 40 MG/DL (ref 0–130)
POTASSIUM SERPL-SCNC: 5 MMOL/L (ref 3.7–5.3)
SODIUM BLD-SCNC: 140 MMOL/L (ref 135–144)
TRIGL SERPL-MCNC: 213 MG/DL
TSH SERPL DL<=0.05 MIU/L-ACNC: 3.96 MIU/L (ref 0.3–5)
VLDLC SERPL CALC-MCNC: ABNORMAL MG/DL (ref 1–30)

## 2020-01-02 PROCEDURE — G8482 FLU IMMUNIZE ORDER/ADMIN: HCPCS | Performed by: FAMILY MEDICINE

## 2020-01-02 PROCEDURE — 36415 COLL VENOUS BLD VENIPUNCTURE: CPT | Performed by: FAMILY MEDICINE

## 2020-01-02 PROCEDURE — 80061 LIPID PANEL: CPT

## 2020-01-02 PROCEDURE — 80051 ELECTROLYTE PANEL: CPT

## 2020-01-02 PROCEDURE — 4040F PNEUMOC VAC/ADMIN/RCVD: CPT | Performed by: FAMILY MEDICINE

## 2020-01-02 PROCEDURE — 1090F PRES/ABSN URINE INCON ASSESS: CPT | Performed by: FAMILY MEDICINE

## 2020-01-02 PROCEDURE — 84443 ASSAY THYROID STIM HORMONE: CPT

## 2020-01-02 PROCEDURE — 1123F ACP DISCUSS/DSCN MKR DOCD: CPT | Performed by: FAMILY MEDICINE

## 2020-01-02 PROCEDURE — 1036F TOBACCO NON-USER: CPT | Performed by: FAMILY MEDICINE

## 2020-01-02 PROCEDURE — 99214 OFFICE O/P EST MOD 30 MIN: CPT | Performed by: FAMILY MEDICINE

## 2020-01-02 PROCEDURE — 82565 ASSAY OF CREATININE: CPT

## 2020-01-02 PROCEDURE — G8417 CALC BMI ABV UP PARAM F/U: HCPCS | Performed by: FAMILY MEDICINE

## 2020-01-02 PROCEDURE — G8427 DOCREV CUR MEDS BY ELIG CLIN: HCPCS | Performed by: FAMILY MEDICINE

## 2020-01-02 RX ORDER — LEVOTHYROXINE SODIUM 0.1 MG/1
100 TABLET ORAL DAILY
Qty: 90 TABLET | Refills: 1 | Status: SHIPPED | OUTPATIENT
Start: 2020-01-02 | End: 2020-07-01 | Stop reason: SDUPTHER

## 2020-01-02 ASSESSMENT — PATIENT HEALTH QUESTIONNAIRE - PHQ9
1. LITTLE INTEREST OR PLEASURE IN DOING THINGS: 0
SUM OF ALL RESPONSES TO PHQ QUESTIONS 1-9: 0
2. FEELING DOWN, DEPRESSED OR HOPELESS: 0
SUM OF ALL RESPONSES TO PHQ QUESTIONS 1-9: 0
SUM OF ALL RESPONSES TO PHQ9 QUESTIONS 1 & 2: 0

## 2020-01-02 ASSESSMENT — ENCOUNTER SYMPTOMS: SHORTNESS OF BREATH: 0

## 2020-01-02 NOTE — PROGRESS NOTES
[address] [facility]  Dept: [phone]  Dept Fax:[phone]    Alexi Smith is a 80 y.o. female who presents today for her medical conditions/complaints as noted below. Alexi Smith is c/o of Hypertension and Hypothyroidism      HPI:     HPI  Here for follow up of CLL, HTN, Hyperlipidemia and Hypothyroid  Taking all medications regularly  No side effects noted except at below. other complaint currently fatigue past several weeks. Some lightheadedness noted recently  Feels like when thyroid off in past    Some worry over daughter travel to [place] for great grandchild birth.     BP Readings from Last 3 Encounters:   01/02/20 124/62   07/11/19 128/62   01/08/19 134/74          (goal 120/80)    Past Medical History:   Diagnosis Date    Osteopenia       Past Surgical History:   Procedure Laterality Date    CATARACT REMOVAL      GALLBLADDER SURGERY      HYSTERECTOMY, VAGINAL         Family History   Problem Relation Age of Onset    Stroke Mother     Stroke Father        Social History     Tobacco Use    Smoking status: Never Smoker    Smokeless tobacco: Never Used   Substance Use Topics    Alcohol use: No      Current Outpatient Medications   Medication Sig Dispense Refill    ferrous sulfate 325 (65 Fe) MG tablet Take 1 tablet by mouth 2 times daily 30 tablet 5    omeprazole (PRILOSEC) 20 MG delayed release capsule TAKE 1 CAPSULE DAILY 90 capsule 3    lisinopril (PRINIVIL;ZESTRIL) 5 MG tablet TAKE ONE TABLET BY MOUTH ONCE DAILY 90 tablet 1    levothyroxine (SYNTHROID) 88 MCG tablet Take 1 tablet by mouth Daily 90 tablet 1    celecoxib (CELEBREX) 100 MG capsule Take 1 capsule by mouth 2 times daily 60 capsule 5    simvastatin (ZOCOR) 40 MG tablet TAKE 1 TABLET NIGHTLY 90 tablet 3    Inulin (FIBER CHOICE PO) Take by mouth      Multiple Vitamins-Minerals (MULTIVITAMIN ADULT PO) Take by mouth 2 times daily      docusate sodium (DULCOLAX) 100 MG capsule Take 100 mg by mouth daily      Calcium Carb-Cholecalciferol (CALTRATE 600+D) 600-800 MG-UNIT TABS Take by mouth 2 times daily      sertraline (ZOLOFT) 25 MG tablet Take 25 mg by mouth daily       No current facility-administered medications for this visit. No Known Allergies    Health Maintenance   Topic Date Due    Lipid screen  10/20/2018    Annual Wellness Visit (AWV)  05/29/2019    TSH testing  07/08/2020    Potassium monitoring  09/23/2020    Creatinine monitoring  09/23/2020    DTaP/Tdap/Td vaccine (2 - Td) 10/09/2028    Flu vaccine  Completed    Shingles Vaccine  Completed    Pneumococcal 65+ years Vaccine  Completed       Subjective:      Review of Systems   Constitutional: Positive for fatigue (last few weeks, just wants to sleep and not go anywhere). Respiratory: Negative for shortness of breath. Cardiovascular: Negative for chest pain, palpitations and leg swelling. Genitourinary: Negative for frequency. Neurological: Positive for light-headedness. Negative for dizziness. Home BP Checks?no  Medication Compliant? yes    Objective:     /62 (Site: Left Upper Arm, Position: Sitting, Cuff Size: Large Adult)   Pulse 77   Ht 4' 5.9\" (1.369 m)   Wt 149 lb (67.6 kg)   SpO2 97%   BMI 36.06 kg/m²   Physical Exam  Vitals signs reviewed. Constitutional:       General: She is not in acute distress. Appearance: She is well-developed. HENT:      Head: Atraumatic. Neck:      Musculoskeletal: Neck supple. Thyroid: No thyromegaly. Vascular: No carotid bruit. Cardiovascular:      Rate and Rhythm: Normal rate and regular rhythm. Heart sounds: No murmur. Pulmonary:      Effort: Pulmonary effort is normal.      Breath sounds: Normal breath sounds. Abdominal:      General: Bowel sounds are normal.      Palpations: Abdomen is soft. Musculoskeletal:         General: No swelling (BLE).    Neurological:      Mental Status: She is alert and oriented to person, place, and time. TSH 12/19/18 was 0.04  7/8/19 noted 2.23     Lab Results   Component Value Date     01/02/2020    K 5.0 01/02/2020     01/02/2020    CO2 24 01/02/2020     Lab Results   Component Value Date    CREATININE 0.99 (H) 01/02/2020     Lab Results   Component Value Date    ALT 18 09/23/2019    AST 34 09/23/2019    ALKPHOS 68 09/23/2019    BILITOT 0.5 09/23/2019     Lab Results   Component Value Date    WBC 10.59 09/23/2019    HGB 13.0 09/23/2019    HCT 40.7 09/23/2019    MCV 93.5 09/23/2019     09/23/2019       Assessment:      1. Essential hypertension    2. Acquired hypothyroidism    3. CLL (chronic lymphocytic leukemia) (HCC)    4. Age-related osteoporosis without current pathological fracture    5. Deficiency, lipoprotein    6. Fatigue, unspecified type             Plan:     There are no Patient Instructions on file for this visit. Orders Placed This Encounter   Procedures    Lipid Panel     Standing Status:   Future     Number of Occurrences:   1     Standing Expiration Date:   1/1/2021     Order Specific Question:   Is Patient Fasting?/# of Hours     Answer:   10-12    Electrolyte Panel     Standing Status:   Future     Number of Occurrences:   1     Standing Expiration Date:   1/1/2021    Creatinine, Serum     Standing Status:   Future     Number of Occurrences:   1     Standing Expiration Date:   1/1/2021    TSH without Reflex     Standing Status:   Future     Number of Occurrences:   1     Standing Expiration Date:   1/1/2021    CBC Auto Differential     Standing Status:   Future     Standing Expiration Date:   1/2/2021     No orders of the defined types were placed in this encounter. Return in about 6 months (around 7/2/2020) for HTN, hypothyroid, lipid- may have as wellness . Discussed use, benefit, and side effects of prescribed medications. All patient questions answered. Pt voiced understanding.  Reviewed health maintenance. Instructed to continue current medications, diet and exercise. Patient agreed with treatment plan. Follow up as directed. Dose on synthroid changed late in day once lab values had returned.     Electronically signedby Vibha Varghese MD on 1/2/2020

## 2020-01-03 RX ORDER — LISINOPRIL 5 MG/1
TABLET ORAL
Qty: 90 TABLET | Refills: 1 | Status: SHIPPED | OUTPATIENT
Start: 2020-01-03 | End: 2020-07-01 | Stop reason: SDUPTHER

## 2020-01-22 RX ORDER — SIMVASTATIN 40 MG
TABLET ORAL
Qty: 90 TABLET | Refills: 3 | Status: SHIPPED | OUTPATIENT
Start: 2020-01-22 | End: 2021-01-18 | Stop reason: SDUPTHER

## 2020-01-22 NOTE — TELEPHONE ENCOUNTER
Ga Meeks is calling to request a refill on the following medication(s):  Requested Prescriptions     Pending Prescriptions Disp Refills    simvastatin (ZOCOR) 40 MG tablet 90 tablet 3     Sig: TAKE 1 TABLET NIGHTLY       Last Visit Date (If Applicable):  3/0/7212    Next Visit Date:    7/1/2020

## 2020-04-21 LAB
ALBUMIN/GLOBULIN RATIO: 1.7 G/DL
ALBUMIN: 4.6 G/DL (ref 3.5–5)
ALP BLD-CCNC: 77 UNITS/L (ref 38–126)
ALT SERPL-CCNC: 11 UNITS/L (ref 9–52)
ANION GAP SERPL CALCULATED.3IONS-SCNC: 10.6 MMOL/L
ANISOCYTOSIS: NORMAL
AST SERPL-CCNC: 27 UNITS/L (ref 14–36)
BANDED NEUTROPHILS RELATIVE PERCENT: 0 % (ref 0–5)
BASOPHILS %. MANUAL COUNT: 0 % (ref 0–1)
BILIRUB SERPL-MCNC: 0.3 MG/DL (ref 0.2–1.3)
BUN BLDV-MCNC: 24 MG/DL (ref 7–17)
CALCIUM SERPL-MCNC: 9.7 MG/DL (ref 8.4–10.2)
CHLORIDE BLD-SCNC: 106 MMOL/L (ref 98–120)
CO2: 24 MMOL/L (ref 22–31)
CREAT SERPL-MCNC: 1.2 MG/DL (ref 0.5–1)
EOSINOPHILS % MANUAL COUNT: 1
GFR CALCULATED: 45.3
GLOBULIN: 2.8 G/DL
GLUCOSE: 97 MG/DL (ref 65–105)
HCT VFR BLD CALC: 37.9 % (ref 37–47)
HEMOGLOBIN: 12.5 (ref 12–16)
IGA: NORMAL MG/DL (ref 70–400)
IGG: 1042 MG/DL (ref 700–1600)
LYMPHOCYTES % MANUAL COUNT: 95 % (ref 21–51)
MCH RBC QN AUTO: 30.8 PG (ref 28.5–32.5)
MCHC RBC AUTO-ENTMCNC: 32.9 G/DL (ref 32–37)
MCV RBC AUTO: 93.5 FL (ref 80–94)
MONOCYTES RELATIVE PERCENT: 1 % (ref 2–9)
NEUTROPHILS %. MANUAL COUNT: 3 % (ref 42–75)
PDW BLD-RTO: 13.8 % (ref 8.5–15.5)
PLATELET # BLD: 108.3 THOU/MM3 (ref 130–400)
POTASSIUM SERPL-SCNC: 4.2 MMOL/L (ref 3.6–5)
RBC: 4.05 M/UL (ref 4.2–5.4)
SMUDGE CELLS: PRESENT
SODIUM BLD-SCNC: 141 MMOL/L (ref 135–145)
TOTAL PROTEIN, SERUM: 7.3 G/DL (ref 6.3–8.2)
WBC: 72.5 THOU/ML3 (ref 4.8–10.8)

## 2020-07-01 ENCOUNTER — OFFICE VISIT (OUTPATIENT)
Dept: FAMILY MEDICINE CLINIC | Age: 85
End: 2020-07-01
Payer: MEDICARE

## 2020-07-01 VITALS
WEIGHT: 140 LBS | SYSTOLIC BLOOD PRESSURE: 130 MMHG | DIASTOLIC BLOOD PRESSURE: 70 MMHG | HEART RATE: 79 BPM | OXYGEN SATURATION: 97 % | BODY MASS INDEX: 32.4 KG/M2 | HEIGHT: 55 IN

## 2020-07-01 PROCEDURE — 4040F PNEUMOC VAC/ADMIN/RCVD: CPT | Performed by: FAMILY MEDICINE

## 2020-07-01 PROCEDURE — G8417 CALC BMI ABV UP PARAM F/U: HCPCS | Performed by: FAMILY MEDICINE

## 2020-07-01 PROCEDURE — 99211 OFF/OP EST MAY X REQ PHY/QHP: CPT | Performed by: FAMILY MEDICINE

## 2020-07-01 PROCEDURE — 1123F ACP DISCUSS/DSCN MKR DOCD: CPT | Performed by: FAMILY MEDICINE

## 2020-07-01 PROCEDURE — G8427 DOCREV CUR MEDS BY ELIG CLIN: HCPCS | Performed by: FAMILY MEDICINE

## 2020-07-01 PROCEDURE — 99213 OFFICE O/P EST LOW 20 MIN: CPT | Performed by: FAMILY MEDICINE

## 2020-07-01 PROCEDURE — 1090F PRES/ABSN URINE INCON ASSESS: CPT | Performed by: FAMILY MEDICINE

## 2020-07-01 PROCEDURE — 1036F TOBACCO NON-USER: CPT | Performed by: FAMILY MEDICINE

## 2020-07-01 RX ORDER — LEVOTHYROXINE SODIUM 0.1 MG/1
100 TABLET ORAL DAILY
Qty: 90 TABLET | Refills: 1 | Status: SHIPPED | OUTPATIENT
Start: 2020-07-01 | End: 2021-01-04 | Stop reason: SDUPTHER

## 2020-07-01 RX ORDER — LISINOPRIL 5 MG/1
TABLET ORAL
Qty: 90 TABLET | Refills: 1 | Status: SHIPPED | OUTPATIENT
Start: 2020-07-01 | End: 2020-12-14

## 2020-07-01 ASSESSMENT — ENCOUNTER SYMPTOMS: SHORTNESS OF BREATH: 0

## 2020-07-01 NOTE — PROGRESS NOTES
[address] [facility]  Dept: [phone]  Dept Fax:[phone]    Henri Patricio is a 80 y.o. female who presents today for her medical conditions/complaints as noted below.   Henri Patricio is c/o of Hypertension      HPI:     HPI  Here for follow up of CLL, HTN, Hyperlipidemia and Hypothyroid  Taking all medications regularly  No side effects noted    No other complaint currently, feeling good    BP Readings from Last 3 Encounters:   07/01/20 130/70   01/02/20 124/62   07/11/19 128/62          (goal 120/80)    Past Medical History:   Diagnosis Date    Osteopenia       Past Surgical History:   Procedure Laterality Date    CATARACT REMOVAL      GALLBLADDER SURGERY      HYSTERECTOMY, VAGINAL         Family History   Problem Relation Age of Onset    Stroke Mother     Stroke Father        Social History     Tobacco Use    Smoking status: Never Smoker    Smokeless tobacco: Never Used   Substance Use Topics    Alcohol use: No      Current Outpatient Medications   Medication Sig Dispense Refill    lisinopril (PRINIVIL;ZESTRIL) 5 MG tablet TAKE ONE TABLET BY MOUTH ONCE DAILY 90 tablet 1    levothyroxine (SYNTHROID) 100 MCG tablet Take 1 tablet by mouth Daily 90 tablet 1    simvastatin (ZOCOR) 40 MG tablet TAKE 1 TABLET NIGHTLY 90 tablet 3    ferrous sulfate 325 (65 Fe) MG tablet Take 1 tablet by mouth 2 times daily 30 tablet 5    omeprazole (PRILOSEC) 20 MG delayed release capsule TAKE 1 CAPSULE DAILY 90 capsule 3    celecoxib (CELEBREX) 100 MG capsule Take 1 capsule by mouth 2 times daily 60 capsule 5    Inulin (FIBER CHOICE PO) Take by mouth      Multiple Vitamins-Minerals (MULTIVITAMIN ADULT PO) Take by mouth 2 times daily      docusate sodium (DULCOLAX) 100 MG capsule Take 100 mg by mouth daily      Calcium Carb-Cholecalciferol (CALTRATE 600+D) 600-800 MG-UNIT TABS Take by mouth 2 times daily      sertraline (ZOLOFT) 25 MG years.    Assessment:      1. Essential hypertension    2. Acquired hypothyroidism    3. CLL (chronic lymphocytic leukemia) ([facility] [address]    4. BMI 33.0-33.9,adult           Plan:     There are no Patient Instructions on file for this visit. Orders Placed This Encounter   Procedures    TSH without Reflex     Standing Status:   Future     Standing Expiration Date:   7/1/2021     Orders Placed This Encounter   Medications    lisinopril (PRINIVIL;ZESTRIL) 5 MG tablet     Sig: TAKE ONE TABLET BY MOUTH ONCE DAILY     Dispense:  90 tablet     Refill:  1    levothyroxine (SYNTHROID) 100 MCG tablet     Sig: Take 1 tablet by mouth Daily     Dispense:  90 tablet     Refill:  1        Return in about 6 months (around 1/1/2021) for thyroid, HTN. Discussed use, benefit, and side effects of prescribed medications. All patient questions answered. Pt voiced understanding. Reviewed health maintenance. Instructed to continue current medications, diet and exercise. Patient agreed with treatment plan. Follow up as directed.      Electronically signedby Tash Meek MD on 7/1/2020

## 2020-07-14 ENCOUNTER — OFFICE VISIT (OUTPATIENT)
Dept: FAMILY MEDICINE CLINIC | Age: 85
End: 2020-07-14
Payer: MEDICARE

## 2020-07-14 VITALS
HEIGHT: 55 IN | SYSTOLIC BLOOD PRESSURE: 120 MMHG | BODY MASS INDEX: 31.94 KG/M2 | DIASTOLIC BLOOD PRESSURE: 78 MMHG | OXYGEN SATURATION: 98 % | WEIGHT: 138 LBS | HEART RATE: 85 BPM

## 2020-07-14 PROBLEM — M54.50 CHRONIC MIDLINE LOW BACK PAIN WITHOUT SCIATICA: Status: ACTIVE | Noted: 2020-07-14

## 2020-07-14 PROBLEM — G89.29 CHRONIC MIDLINE LOW BACK PAIN WITHOUT SCIATICA: Status: ACTIVE | Noted: 2020-07-14

## 2020-07-14 PROCEDURE — 1123F ACP DISCUSS/DSCN MKR DOCD: CPT | Performed by: FAMILY MEDICINE

## 2020-07-14 PROCEDURE — 1036F TOBACCO NON-USER: CPT | Performed by: FAMILY MEDICINE

## 2020-07-14 PROCEDURE — 99214 OFFICE O/P EST MOD 30 MIN: CPT | Performed by: FAMILY MEDICINE

## 2020-07-14 PROCEDURE — G8427 DOCREV CUR MEDS BY ELIG CLIN: HCPCS | Performed by: FAMILY MEDICINE

## 2020-07-14 PROCEDURE — 99213 OFFICE O/P EST LOW 20 MIN: CPT | Performed by: FAMILY MEDICINE

## 2020-07-14 PROCEDURE — G8417 CALC BMI ABV UP PARAM F/U: HCPCS | Performed by: FAMILY MEDICINE

## 2020-07-14 PROCEDURE — 1090F PRES/ABSN URINE INCON ASSESS: CPT | Performed by: FAMILY MEDICINE

## 2020-07-14 PROCEDURE — 4040F PNEUMOC VAC/ADMIN/RCVD: CPT | Performed by: FAMILY MEDICINE

## 2020-07-14 RX ORDER — TRAMADOL HYDROCHLORIDE 50 MG/1
50 TABLET ORAL EVERY 6 HOURS PRN
Qty: 28 TABLET | Refills: 0 | Status: SHIPPED | OUTPATIENT
Start: 2020-07-14 | End: 2020-07-21

## 2020-07-14 ASSESSMENT — ENCOUNTER SYMPTOMS: BACK PAIN: 1

## 2020-07-14 NOTE — PROGRESS NOTES
[address] [facility]  Dept: [phone]  Dept Fax:[phone]      Lilton Severin is a 80 y.o. female who presents today for her medical conditions/complaints as noted below. Chief Complaint   Patient presents with    Back Pain       HPI:     HPI  Pt is here for back pain in mid lower back. Some concern for kidney per pt. Patient reporting pain started on Saturday hurts more when she is up walking or getting up from a chair. She does not recall any significant injury or harm. Patient is not getting any better since the weekend. Pain seems to be in the lower back. Persisting recent similar sx noted now feeling worse. Not able to tolerate celebrex with sick to stomach feeling. No fevers    Prior to Visit Medications    Medication Sig Taking?  Authorizing Provider   lisinopril (PRINIVIL;ZESTRIL) 5 MG tablet TAKE ONE TABLET BY MOUTH ONCE DAILY  Veronica Dubois MD   levothyroxine (SYNTHROID) 100 MCG tablet Take 1 tablet by mouth Daily  Veronica Dubois MD   simvastatin (ZOCOR) 40 MG tablet TAKE 1 TABLET NIGHTLY  Brent Contreras MD   ferrous sulfate 325 (65 Fe) MG tablet Take 1 tablet by mouth 2 times daily  Veronica Dubois MD   omeprazole (PRILOSEC) 20 MG delayed release capsule TAKE 1 CAPSULE DAILY  Brent Contreras MD   celecoxib (CELEBREX) 100 MG capsule Take 1 capsule by mouth 2 times daily  Veronica Dubois MD   Inulin (FIBER CHOICE PO) Take by mouth  Historical Provider, MD   Multiple Vitamins-Minerals (MULTIVITAMIN ADULT PO) Take by mouth 2 times daily  Historical Provider, MD   docusate sodium (DULCOLAX) 100 MG capsule Take 100 mg by mouth daily  Historical Provider, MD   Calcium Carb-Cholecalciferol (CALTRATE 600+D) 600-800 MG-UNIT TABS Take by mouth 2 times daily  Historical Provider, MD   sertraline (ZOLOFT) 25 MG tablet Take 25 mg by mouth daily  Historical Provider, MD       Allergies   Allergen Reactions    Celebrex [Celecoxib] Nausea Only     Upset stomach       Past Medical History:   Diagnosis Date    Osteopenia      Past Surgical History:   Procedure Laterality Date    CATARACT REMOVAL      GALLBLADDER SURGERY      HYSTERECTOMY, VAGINAL       Social History     Socioeconomic History    Marital status:      Spouse name: Not on file    Number of children: Not on file    Years of education: Not on file    Highest education level: Not on file   Occupational History    Not on file   Social Needs    Financial resource strain: Not on file    Food insecurity     Worry: Not on file     Inability: Not on file    Transportation needs     Medical: Not on file     Non-medical: Not on file   Tobacco Use    Smoking status: Never Smoker    Smokeless tobacco: Never Used   Substance and Sexual Activity    Alcohol use: No    Drug use: Not on file    Sexual activity: Not on file   Lifestyle    Physical activity     Days per week: Not on file     Minutes per session: Not on file    Stress: Not on file   Relationships    Social connections     Talks on phone: Not on file     Gets together: Not on file     Attends Faith service: Not on file     Active member of club or organization: Not on file     Attends meetings of clubs or organizations: Not on file     Relationship status: Not on file    Intimate partner violence     Fear of current or ex partner: Not on file     Emotionally abused: Not on file     Physically abused: Not on file     Forced sexual activity: Not on file   Other Topics Concern    Not on file   Social History Narrative    Not on file     Family History   Problem Relation Age of Onset    Stroke Mother     Stroke Father        Subjective:      Review of Systems   Musculoskeletal: Positive for back pain (started saturday, hurts more when she walks and gets up from a chair, she doesn't recall doing anything to harm it. pain is not getting any better. Lower back).  Negative for neck pain and neck stiffness. Objective:     /78 (Site: Left Upper Arm, Position: Sitting, Cuff Size: Large Adult)   Pulse 85   Ht 4' 6\" (1.372 m)   Wt 138 lb (62.6 kg)   SpO2 98%   BMI 33.27 kg/m²     Physical Exam  Constitutional:       General: She is not in acute distress. Appearance: Normal appearance. She is not ill-appearing. HENT:      Head: Normocephalic. Pulmonary:      Effort: Pulmonary effort is normal. No respiratory distress. Abdominal:      Tenderness: There is no right CVA tenderness or left CVA tenderness. Musculoskeletal:      Cervical back: Normal.      Thoracic back: She exhibits tenderness (paravertebral mild upper thorax). Neurological:      Mental Status: She is alert. Assessment:      Diagnosis Orders   1. Chronic midline low back pain without sciatica  XR LUMBAR SPINE (2-3 VIEWS)    traMADol (ULTRAM) 50 MG tablet    [facility] [place] [facility]   2. Age-related osteoporosis without current pathological fracture  DEXA Bone Density 2 Sites     No results found for this visit on 07/14/20.    :     No follow-ups on file. There are no Patient Instructions on file for this visit.       Orders Placed This Encounter   Procedures    DEXA Bone Density 2 Sites     Standing Status:   Future     Standing Expiration Date:   9/14/2020     Order Specific Question:   Reason for exam:     Answer:   most recent test 2015 with osteopenia only    XR LUMBAR SPINE (2-3 VIEWS)     Standing Status:   Future     Standing Expiration Date:   7/14/2021   [facility]     Referral Priority:   Routine     Referral Type:   Eval and Treat     Referral Reason:   Specialty Services Required     Referral Location:   [facility]     Requested Specialty:   Physical Therapy     Number of Visits Requested:   1       Electronically signed by David Ceballos MD on 7/14/2020 at11:53 AM

## 2020-10-12 LAB
ALBUMIN/GLOBULIN RATIO: 1.7 G/DL
ALBUMIN: 4.4 G/DL (ref 3.5–5)
ALP BLD-CCNC: 60 UNITS/L (ref 38–126)
ALT SERPL-CCNC: 17 UNITS/L (ref 4–35)
ANION GAP SERPL CALCULATED.3IONS-SCNC: 8.2 MMOL/L
AST SERPL-CCNC: 30 UNITS/L (ref 14–36)
BANDED NEUTROPHILS RELATIVE PERCENT: 0 % (ref 0–5)
BASOPHILS %. MANUAL COUNT: 0 % (ref 0–1)
BILIRUB SERPL-MCNC: 0.5 MG/DL (ref 0.2–1.3)
BUN BLDV-MCNC: 24 MG/DL (ref 7–17)
CALCIUM SERPL-MCNC: 8.9 MG/DL (ref 8.4–10.2)
CHLORIDE BLD-SCNC: 102 MMOL/L (ref 98–120)
CO2: 27 MMOL/L (ref 22–31)
CREAT SERPL-MCNC: 0.9 MG/DL (ref 0.5–1)
EOSINOPHILS % MANUAL COUNT: 0 (ref 0–10)
GFR CALCULATED: > 60
GLOBULIN: 2.5 G/DL
GLUCOSE: 72 MG/DL (ref 65–105)
HCT VFR BLD CALC: 34.3 % (ref 37–47)
HEMOGLOBIN: 10.7 (ref 12–16)
HYPOCHROMIA: NORMAL
LACTATE DEHYDROGENASE: 520 UNITS/L (ref 313–618)
LYMPHOCYTES % MANUAL COUNT: 95 % (ref 21–51)
MACROCYTOSIS: NORMAL
MCH RBC QN AUTO: 30.7 PG (ref 28.5–32.5)
MCHC RBC AUTO-ENTMCNC: 31.3 G/DL (ref 32–37)
MCV RBC AUTO: 98.1 FL (ref 80–94)
MONOCYTES RELATIVE PERCENT: 3 % (ref 2–9)
NEUTROPHILS %. MANUAL COUNT: 2 % (ref 42–75)
PDW BLD-RTO: 13.9 % (ref 8.5–15.5)
PLATELET # BLD: 173.9 THOU/MM3 (ref 130–400)
POTASSIUM SERPL-SCNC: 5 MMOL/L (ref 3.6–5)
RBC: 3.49 M/UL (ref 4.2–5.4)
SMUDGE CELLS: PRESENT
SODIUM BLD-SCNC: 137 MMOL/L (ref 135–145)
TOTAL PROTEIN, SERUM: 6.9 G/DL (ref 6.3–8.2)
WBC: 165.5 THOU/ML3 (ref 4.8–10.8)

## 2020-10-27 LAB
BANDED NEUTROPHILS RELATIVE PERCENT: 2 % (ref 0–5)
BASOPHILS %. MANUAL COUNT: 0 % (ref 0–1)
EOSINOPHILS % MANUAL COUNT: 0 (ref 0–10)
HCT VFR BLD CALC: 31.8 % (ref 37–47)
HEMOGLOBIN: 10.2 (ref 12–16)
LYMPHOCYTES % MANUAL COUNT: 93 % (ref 21–51)
MCH RBC QN AUTO: 30.5 PG (ref 28.5–32.5)
MCHC RBC AUTO-ENTMCNC: 32.2 G/DL (ref 32–37)
MCV RBC AUTO: 94.6 FL (ref 80–94)
MONOCYTES RELATIVE PERCENT: 0 % (ref 2–9)
NEUTROPHILS %. MANUAL COUNT: 5 % (ref 42–75)
PDW BLD-RTO: 13.3 % (ref 8.5–15.5)
PLATELET # BLD: 137.8 THOU/MM3 (ref 130–400)
RBC: 3.36 M/UL (ref 4.2–5.4)
WBC: 73.2 THOU/ML3 (ref 4.8–10.8)

## 2020-11-09 LAB
ALBUMIN/GLOBULIN RATIO: 1.5 G/DL
ALBUMIN: 4.1 G/DL (ref 3.5–5)
ALP BLD-CCNC: 52 UNITS/L (ref 38–126)
ALT SERPL-CCNC: 17 UNITS/L (ref 4–35)
ANION GAP SERPL CALCULATED.3IONS-SCNC: 11.8 MMOL/L
AST SERPL-CCNC: 39 UNITS/L (ref 14–36)
BANDED NEUTROPHILS RELATIVE PERCENT: 0 % (ref 0–5)
BASOPHILS %. MANUAL COUNT: 1 % (ref 0–1)
BILIRUB SERPL-MCNC: 0.6 MG/DL (ref 0.2–1.3)
BUN BLDV-MCNC: 23 MG/DL (ref 7–17)
CALCIUM SERPL-MCNC: 8.9 MG/DL (ref 8.4–10.2)
CHLORIDE BLD-SCNC: 98 MMOL/L (ref 98–120)
CO2: 27 MMOL/L (ref 22–31)
CREAT SERPL-MCNC: 0.8 MG/DL (ref 0.5–1)
EOSINOPHILS % MANUAL COUNT: 1 (ref 0–10)
GFR CALCULATED: > 60
GLOBULIN: 2.8 G/DL
GLUCOSE: 88 MG/DL (ref 65–105)
HCT VFR BLD CALC: 34.6 % (ref 37–47)
HEMOGLOBIN: 11 (ref 12–16)
LACTATE DEHYDROGENASE: 762 UNITS/L (ref 313–618)
LYMPHOCYTES % MANUAL COUNT: 93 % (ref 21–51)
MCH RBC QN AUTO: 30.5 PG (ref 28.5–32.5)
MCHC RBC AUTO-ENTMCNC: 31.8 G/DL (ref 32–37)
MCV RBC AUTO: 96 FL (ref 80–94)
MONOCYTES RELATIVE PERCENT: 2 % (ref 2–9)
NEUTROPHILS %. MANUAL COUNT: 3 % (ref 42–75)
PDW BLD-RTO: 12.8 % (ref 8.5–15.5)
PLATELET # BLD: 190.6 THOU/MM3 (ref 130–400)
POTASSIUM SERPL-SCNC: 4.8 MMOL/L (ref 3.6–5)
RBC: 3.6 M/UL (ref 4.2–5.4)
SMUDGE CELLS: PRESENT
SODIUM BLD-SCNC: 132 MMOL/L (ref 135–145)
TOTAL PROTEIN, SERUM: 6.8 G/DL (ref 6.3–8.2)
WBC: 72.3 THOU/ML3 (ref 4.8–10.8)

## 2020-12-08 LAB
ALBUMIN/GLOBULIN RATIO: 1.6 G/DL
ALBUMIN: 4.1 G/DL (ref 3.5–5)
ALP BLD-CCNC: 55 UNITS/L (ref 38–126)
ALT SERPL-CCNC: 14 UNITS/L (ref 4–35)
ANION GAP SERPL CALCULATED.3IONS-SCNC: 10.5 MMOL/L
AST SERPL-CCNC: 24 UNITS/L (ref 14–36)
BANDED NEUTROPHILS RELATIVE PERCENT: 0 % (ref 0–5)
BASOPHILS %. MANUAL COUNT: 0 % (ref 0–1)
BILIRUB SERPL-MCNC: 0.2 MG/DL (ref 0.2–1.3)
BUN BLDV-MCNC: 19 MG/DL (ref 7–17)
CALCIUM SERPL-MCNC: 9.1 MG/DL (ref 8.4–10.2)
CHLORIDE BLD-SCNC: 99 MMOL/L (ref 98–120)
CO2: 26 MMOL/L (ref 22–31)
CREAT SERPL-MCNC: 0.9 MG/DL (ref 0.5–1)
EOSINOPHILS % MANUAL COUNT: 0 (ref 0–10)
GFR CALCULATED: > 60
GLOBULIN: 2.6 G/DL
GLUCOSE: 88 MG/DL (ref 65–105)
HCT VFR BLD CALC: 37.3 % (ref 37–47)
HEMOGLOBIN: 12.3 (ref 12–16)
LACTATE DEHYDROGENASE: 457 UNITS/L (ref 313–618)
LYMPHOCYTES % MANUAL COUNT: 85 % (ref 21–51)
MCH RBC QN AUTO: 30 PG (ref 28.5–32.5)
MCHC RBC AUTO-ENTMCNC: 32.9 G/DL (ref 32–37)
MCV RBC AUTO: 91.1 FL (ref 80–94)
MONOCYTES RELATIVE PERCENT: 5 % (ref 2–9)
NEUTROPHILS %. MANUAL COUNT: 8 % (ref 42–75)
PDW BLD-RTO: 12.5 % (ref 8.5–15.5)
PLATELET # BLD: 186.8 THOU/MM3 (ref 130–400)
POTASSIUM SERPL-SCNC: 4.5 MMOL/L (ref 3.6–5)
RBC: 4.09 M/UL (ref 4.2–5.4)
REACTIVE LYMPHOCYTES: 2 % (ref 0–5)
SMUDGE CELLS: PRESENT
SODIUM BLD-SCNC: 131 MMOL/L (ref 135–145)
TOTAL PROTEIN, SERUM: 6.7 G/DL (ref 6.3–8.2)
WBC: 6 THOU/ML3 (ref 4.8–10.8)

## 2020-12-14 NOTE — TELEPHONE ENCOUNTER
Darci Cho is requesting a refill on the following medication(s):  Requested Prescriptions     Pending Prescriptions Disp Refills    lisinopril (PRINIVIL;ZESTRIL) 5 MG tablet 90 tablet 1     Sig: TAKE ONE TABLET BY MOUTH ONCE DAILY       Last Visit Date (If Applicable):  Visit date not found    Next Visit Date:    Visit date not found

## 2020-12-15 RX ORDER — LISINOPRIL 5 MG/1
TABLET ORAL
Qty: 90 TABLET | Refills: 1 | OUTPATIENT
Start: 2020-12-15

## 2020-12-31 ENCOUNTER — TELEPHONE (OUTPATIENT)
Dept: FAMILY MEDICINE CLINIC | Age: 85
End: 2020-12-31

## 2020-12-31 DIAGNOSIS — E03.9 ACQUIRED HYPOTHYROIDISM: Primary | ICD-10-CM

## 2020-12-31 LAB — TSH SERPL DL<=0.05 MIU/L-ACNC: 17.5 MIU/ML (ref 0.49–4.67)

## 2021-01-04 RX ORDER — LEVOTHYROXINE SODIUM 0.12 MG/1
125 TABLET ORAL DAILY
Qty: 90 TABLET | Refills: 1 | Status: SHIPPED | OUTPATIENT
Start: 2021-01-04 | End: 2021-06-08 | Stop reason: SDUPTHER

## 2021-01-04 NOTE — TELEPHONE ENCOUNTER
Elevated TSH, new dose sent for 125 mcg to pharmacy. Stop 100 mcg dose. Please repeat TSH in 4 weeks to ensure resolving. Please ensure pt taking alone with only a glass of water for at least 30 minutes from all other meds or food.   Thanks

## 2021-01-06 ENCOUNTER — OFFICE VISIT (OUTPATIENT)
Dept: FAMILY MEDICINE CLINIC | Age: 86
End: 2021-01-06
Payer: MEDICARE

## 2021-01-06 VITALS
OXYGEN SATURATION: 96 % | DIASTOLIC BLOOD PRESSURE: 80 MMHG | BODY MASS INDEX: 32.82 KG/M2 | HEART RATE: 68 BPM | HEIGHT: 55 IN | SYSTOLIC BLOOD PRESSURE: 118 MMHG | WEIGHT: 141.8 LBS

## 2021-01-06 DIAGNOSIS — R35.1 NOCTURIA: ICD-10-CM

## 2021-01-06 DIAGNOSIS — E03.9 ACQUIRED HYPOTHYROIDISM: Primary | ICD-10-CM

## 2021-01-06 DIAGNOSIS — C91.10 CLL (CHRONIC LYMPHOCYTIC LEUKEMIA) (HCC): ICD-10-CM

## 2021-01-06 DIAGNOSIS — I10 ESSENTIAL HYPERTENSION: ICD-10-CM

## 2021-01-06 PROCEDURE — 1123F ACP DISCUSS/DSCN MKR DOCD: CPT | Performed by: FAMILY MEDICINE

## 2021-01-06 PROCEDURE — 99211 OFF/OP EST MAY X REQ PHY/QHP: CPT | Performed by: FAMILY MEDICINE

## 2021-01-06 PROCEDURE — G8484 FLU IMMUNIZE NO ADMIN: HCPCS | Performed by: FAMILY MEDICINE

## 2021-01-06 PROCEDURE — G8427 DOCREV CUR MEDS BY ELIG CLIN: HCPCS | Performed by: FAMILY MEDICINE

## 2021-01-06 PROCEDURE — 4040F PNEUMOC VAC/ADMIN/RCVD: CPT | Performed by: FAMILY MEDICINE

## 2021-01-06 PROCEDURE — G8417 CALC BMI ABV UP PARAM F/U: HCPCS | Performed by: FAMILY MEDICINE

## 2021-01-06 PROCEDURE — 1036F TOBACCO NON-USER: CPT | Performed by: FAMILY MEDICINE

## 2021-01-06 PROCEDURE — 1090F PRES/ABSN URINE INCON ASSESS: CPT | Performed by: FAMILY MEDICINE

## 2021-01-06 PROCEDURE — 99214 OFFICE O/P EST MOD 30 MIN: CPT | Performed by: FAMILY MEDICINE

## 2021-01-06 RX ORDER — ACYCLOVIR 400 MG/1
TABLET ORAL
COMMUNITY
Start: 2020-12-12

## 2021-01-06 RX ORDER — IBRUTINIB 420 MG/1
TABLET, FILM COATED ORAL
COMMUNITY
Start: 2020-12-28

## 2021-01-06 RX ORDER — SULFAMETHOXAZOLE AND TRIMETHOPRIM 800; 160 MG/1; MG/1
TABLET ORAL
COMMUNITY
Start: 2020-11-08

## 2021-01-06 SDOH — ECONOMIC STABILITY: FOOD INSECURITY: WITHIN THE PAST 12 MONTHS, YOU WORRIED THAT YOUR FOOD WOULD RUN OUT BEFORE YOU GOT MONEY TO BUY MORE.: NOT ASKED

## 2021-01-06 SDOH — ECONOMIC STABILITY: TRANSPORTATION INSECURITY
IN THE PAST 12 MONTHS, HAS LACK OF TRANSPORTATION KEPT YOU FROM MEETINGS, WORK, OR FROM GETTING THINGS NEEDED FOR DAILY LIVING?: NOT ASKED

## 2021-01-06 SDOH — ECONOMIC STABILITY: INCOME INSECURITY: HOW HARD IS IT FOR YOU TO PAY FOR THE VERY BASICS LIKE FOOD, HOUSING, MEDICAL CARE, AND HEATING?: NOT ASKED

## 2021-01-06 SDOH — ECONOMIC STABILITY: TRANSPORTATION INSECURITY
IN THE PAST 12 MONTHS, HAS THE LACK OF TRANSPORTATION KEPT YOU FROM MEDICAL APPOINTMENTS OR FROM GETTING MEDICATIONS?: NOT ASKED

## 2021-01-06 ASSESSMENT — PATIENT HEALTH QUESTIONNAIRE - PHQ9
1. LITTLE INTEREST OR PLEASURE IN DOING THINGS: 1
SUM OF ALL RESPONSES TO PHQ9 QUESTIONS 1 & 2: 1
SUM OF ALL RESPONSES TO PHQ QUESTIONS 1-9: 1
SUM OF ALL RESPONSES TO PHQ QUESTIONS 1-9: 1

## 2021-01-06 ASSESSMENT — ENCOUNTER SYMPTOMS
SHORTNESS OF BREATH: 0
CONSTIPATION: 0
DIARRHEA: 0
WHEEZING: 0
COUGH: 0
ABDOMINAL PAIN: 0

## 2021-01-06 NOTE — PROGRESS NOTES
[address] [facility]  Dept: [phone]  Dept Fax:[phone]      Remington Dominguez is a 80 y.o. female who presents today for her medical conditions/complaints as noted below. Chief Complaint   Patient presents with    6 Month Follow-Up    Hypertension       HPI:     HPI  Here for follow up of CLL, HTN and Hypothyroid  Taking all medications regularly  No side effects noted    other complaint currently up several times per night to urinate. Not using iron due to stool changes    Prior to Visit Medications    Medication Sig Taking? Authorizing Provider   levothyroxine (SYNTHROID) 125 MCG tablet Take 1 tablet by mouth Daily  John Galeana MD   lisinopril (PRINIVIL;ZESTRIL) 5 MG tablet Take 1 tablet by mouth once daily  John Galeana MD   omeprazole (PRILOSEC) 20 MG delayed release capsule Take 1 capsule by mouth once daily  John Galeana MD   simvastatin (ZOCOR) 40 MG tablet TAKE 1 TABLET NIGHTLY  John Galeana MD   ferrous sulfate 325 (65 Fe) MG tablet Take 1 tablet by mouth 2 times daily  John Galeana MD   Inulin (FIBER CHOICE PO) Take by mouth  Historical Provider, MD   Multiple Vitamins-Minerals (MULTIVITAMIN ADULT PO) Take by mouth 2 times daily  Historical Provider, MD   docusate sodium (DULCOLAX) 100 MG capsule Take 100 mg by mouth daily  Historical Provider, MD   Calcium Carb-Cholecalciferol (CALTRATE 600+D) 600-800 MG-UNIT TABS Take by mouth 2 times daily  Historical Provider, MD   sertraline (ZOLOFT) 25 MG tablet Take 25 mg by mouth daily  Historical Provider, MD       Allergies   Allergen Reactions    Celebrex [Celecoxib] Nausea Only     Upset stomach       Past Medical History:   Diagnosis Date    Osteopenia      Past Surgical History:   Procedure Laterality Date    CATARACT REMOVAL      GALLBLADDER SURGERY      HYSTERECTOMY, VAGINAL       Social History     Socioeconomic History    Marital status:   Spouse name: Not on file    Number of children: Not on file    Years of education: Not on file    Highest education level: Not on file   Occupational History    Not on file   Social Needs    Financial resource strain: Not on file    Food insecurity     Worry: Not on file     Inability: Not on file    Transportation needs     Medical: Not on file     Non-medical: Not on file   Tobacco Use    Smoking status: Never Smoker    Smokeless tobacco: Never Used   Substance and Sexual Activity    Alcohol use: No    Drug use: Not on file    Sexual activity: Not on file   Lifestyle    Physical activity     Days per week: Not on file     Minutes per session: Not on file    Stress: Not on file   Relationships    Social connections     Talks on phone: Not on file     Gets together: Not on file     Attends Caodaism service: Not on file     Active member of club or organization: Not on file     Attends meetings of clubs or organizations: Not on file     Relationship status: Not on file    Intimate partner violence     Fear of current or ex partner: Not on file     Emotionally abused: Not on file     Physically abused: Not on file     Forced sexual activity: Not on file   Other Topics Concern    Not on file   Social History Narrative    Not on file     Family History   Problem Relation Age of Onset    Stroke Mother     Stroke Father        Subjective:      Review of Systems   Constitutional: Positive for fatigue. Negative for fever. Respiratory: Negative for cough, shortness of breath and wheezing. Cardiovascular: Negative for chest pain, palpitations and leg swelling. Gastrointestinal: Negative for abdominal pain, constipation and diarrhea. Genitourinary: Negative for frequency and urgency. Neurological: Positive for headaches. Negative for dizziness.        Objective:     /80   Pulse 68   Ht 4' 6\" (1.372 m)   Wt 141 lb 12.8 oz (64.3 kg)   SpO2 96%   BMI 34.19 kg/m²     Physical Exam HENT:      Head: Normocephalic. Eyes:      Conjunctiva/sclera: Conjunctivae normal.   Neck:      Musculoskeletal: Neck supple. No muscular tenderness. Cardiovascular:      Rate and Rhythm: Normal rate and regular rhythm. Heart sounds: No murmur. Pulmonary:      Effort: Pulmonary effort is normal. No respiratory distress. Abdominal:      General: Bowel sounds are normal.   Musculoskeletal:         General: No swelling. Lymphadenopathy:      Cervical: No cervical adenopathy. Neurological:      Mental Status: She is alert. Psychiatric:         Mood and Affect: Mood normal.         Thought Content: Thought content normal.         Judgment: Judgment normal.       Recent TSH 17.5    Assessment:      Diagnosis Orders   1. Acquired hypothyroidism     2. Essential hypertension     3. CLL (chronic lymphocytic leukemia) ([facility] [address]     4. BMI 34.0-34.9,adult     5. Nocturia       No results found for this visit on 01/06/21.    :     No follow-ups on file. Patient Instructions   May attempt tylenol arthritis for back pain as needed  May get covid vaccine when available    No orders of the defined types were placed in this encounter.     Electronically signed by Magda Coe MD on 1/6/2021 at1:16 PM

## 2021-01-18 DIAGNOSIS — E78.6: ICD-10-CM

## 2021-01-18 RX ORDER — SIMVASTATIN 40 MG
TABLET ORAL
Qty: 90 TABLET | Refills: 3 | Status: SHIPPED | OUTPATIENT
Start: 2021-01-18

## 2021-01-18 NOTE — TELEPHONE ENCOUNTER
Monty Hastings is calling to request a refill on the following medication(s):  Requested Prescriptions     Pending Prescriptions Disp Refills    simvastatin (ZOCOR) 40 MG tablet 90 tablet 3     Sig: TAKE 1 TABLET NIGHTLY       Last Visit Date (If Applicable):  9/5/6561    Next Visit Date:    Visit date not found

## 2021-01-28 LAB — TSH SERPL DL<=0.05 MIU/L-ACNC: 2.31 MIU/ML (ref 0.49–4.67)

## 2021-02-22 LAB
ALBUMIN/GLOBULIN RATIO: 1.6 G/DL
ALBUMIN: 4 G/DL (ref 3.5–5)
ALP BLD-CCNC: 51 UNITS/L (ref 38–126)
ALT SERPL-CCNC: 11 UNITS/L (ref 4–35)
ANION GAP SERPL CALCULATED.3IONS-SCNC: 7.6 MMOL/L
AST SERPL-CCNC: 25 UNITS/L (ref 14–36)
BASOPHILS %: 2.57 (ref 0–3)
BASOPHILS ABSOLUTE: 0.19 (ref 0–0.3)
BILIRUB SERPL-MCNC: 0.5 MG/DL (ref 0.2–1.3)
BUN BLDV-MCNC: 24 MG/DL (ref 7–17)
CALCIUM SERPL-MCNC: 8.8 MG/DL (ref 8.4–10.2)
CHLORIDE BLD-SCNC: 101 MMOL/L (ref 98–120)
CO2: 27 MMOL/L (ref 22–31)
CREAT SERPL-MCNC: 0.8 MG/DL (ref 0.5–1)
EOSINOPHILS %: 1.67 (ref 0–10)
EOSINOPHILS ABSOLUTE: 0.13 (ref 0–1.1)
GFR CALCULATED: > 60
GLOBULIN: 2.5 G/DL
GLUCOSE: 94 MG/DL (ref 65–105)
HCT VFR BLD CALC: 36.9 % (ref 37–47)
HEMOGLOBIN: 11.8 (ref 12–16)
LACTATE DEHYDROGENASE: 539 UNITS/L (ref 313–618)
LYMPHOCYTE %: 63.63 (ref 20–51.1)
LYMPHOCYTES ABSOLUTE: 4.79 (ref 1–5.5)
MCH RBC QN AUTO: 29.8 PG (ref 28.5–32.5)
MCHC RBC AUTO-ENTMCNC: 32.1 G/DL (ref 32–37)
MCV RBC AUTO: 92.7 FL (ref 80–94)
MONOCYTES %: 8.25 (ref 1.7–9.3)
MONOCYTES ABSOLUTE: 0.62 (ref 0.1–1)
NEUTROPHILS %: 23.89 (ref 42.2–75.2)
NEUTROPHILS ABSOLUTE: 1.8 (ref 2–8.1)
PDW BLD-RTO: 13.8 % (ref 8.5–15.5)
PLATELET # BLD: 189.5 THOU/MM3 (ref 130–400)
POTASSIUM SERPL-SCNC: 4.1 MMOL/L (ref 3.6–5)
RBC: 3.98 M/UL (ref 4.2–5.4)
SODIUM BLD-SCNC: 136 MMOL/L (ref 135–145)
TOTAL PROTEIN, SERUM: 6.5 G/DL (ref 6.3–8.2)
WBC: 7.5 THOU/ML3 (ref 4.8–10.8)

## 2021-03-02 ENCOUNTER — TELEPHONE (OUTPATIENT)
Dept: FAMILY MEDICINE CLINIC | Age: 86
End: 2021-03-02

## 2021-03-02 NOTE — TELEPHONE ENCOUNTER
Pt did not receive IVIG today due to high blood pressure was 200/105 207/94 and the other day it was 195/95 185/85, [facility] infusion was concerned so the schedule her an apt for tomorrow at [facility]. with you

## 2021-03-02 NOTE — TELEPHONE ENCOUNTER
Thank you. Please have pt take a second dose of lisinopril today and take 2 tabs tomorrow.   May review further at appointment  Thanks

## 2021-03-03 ENCOUNTER — OFFICE VISIT (OUTPATIENT)
Dept: FAMILY MEDICINE CLINIC | Age: 86
End: 2021-03-03
Payer: MEDICARE

## 2021-03-03 VITALS
WEIGHT: 143 LBS | BODY MASS INDEX: 33.09 KG/M2 | SYSTOLIC BLOOD PRESSURE: 226 MMHG | HEIGHT: 55 IN | DIASTOLIC BLOOD PRESSURE: 112 MMHG | HEART RATE: 65 BPM | OXYGEN SATURATION: 97 %

## 2021-03-03 DIAGNOSIS — F41.9 ANXIETY: ICD-10-CM

## 2021-03-03 DIAGNOSIS — I10 ESSENTIAL HYPERTENSION: Primary | ICD-10-CM

## 2021-03-03 PROCEDURE — G8484 FLU IMMUNIZE NO ADMIN: HCPCS | Performed by: FAMILY MEDICINE

## 2021-03-03 PROCEDURE — 1123F ACP DISCUSS/DSCN MKR DOCD: CPT | Performed by: FAMILY MEDICINE

## 2021-03-03 PROCEDURE — G8427 DOCREV CUR MEDS BY ELIG CLIN: HCPCS | Performed by: FAMILY MEDICINE

## 2021-03-03 PROCEDURE — G8417 CALC BMI ABV UP PARAM F/U: HCPCS | Performed by: FAMILY MEDICINE

## 2021-03-03 PROCEDURE — 99211 OFF/OP EST MAY X REQ PHY/QHP: CPT | Performed by: FAMILY MEDICINE

## 2021-03-03 PROCEDURE — 99213 OFFICE O/P EST LOW 20 MIN: CPT | Performed by: FAMILY MEDICINE

## 2021-03-03 PROCEDURE — 4040F PNEUMOC VAC/ADMIN/RCVD: CPT | Performed by: FAMILY MEDICINE

## 2021-03-03 PROCEDURE — 1036F TOBACCO NON-USER: CPT | Performed by: FAMILY MEDICINE

## 2021-03-03 PROCEDURE — 1090F PRES/ABSN URINE INCON ASSESS: CPT | Performed by: FAMILY MEDICINE

## 2021-03-03 RX ORDER — LISINOPRIL 20 MG/1
TABLET ORAL
Qty: 30 TABLET | Refills: 3 | Status: SHIPPED | OUTPATIENT
Start: 2021-03-03 | End: 2021-04-02 | Stop reason: ALTCHOICE

## 2021-03-03 RX ORDER — DILTIAZEM HYDROCHLORIDE 180 MG/1
180 CAPSULE, COATED, EXTENDED RELEASE ORAL DAILY
Qty: 30 CAPSULE | Refills: 3 | Status: SHIPPED | OUTPATIENT
Start: 2021-03-03 | End: 2021-03-10 | Stop reason: SDUPTHER

## 2021-03-03 ASSESSMENT — ENCOUNTER SYMPTOMS
COUGH: 1
SHORTNESS OF BREATH: 0
WHEEZING: 0

## 2021-03-03 NOTE — PROGRESS NOTES
[address] [facility]  Dept: [phone]  Dept Fax:[phone]    Deepali Wu is a 80 y.o. female who presents today for her medical conditions/complaints as noted below. Chief Complaint   Patient presents with    Hypertension     told her son that she doesnt feel bad, she just doesnt understand what could be causing it. denies any vision changes, states my eyes are bad anyway. denies light headedness, and chest pain. has had a mild headache off and on. if she has one she takes ibuprofen and it takes care of the HA       HPI:     HPI  Here for follow up of HTN  Taking all medications regularly, doubled lisinopril yesterday and today  No side effects noted    No other complaint currently, has noted slight HA though generally feels fine. Noted change of thyroid noted. Aleve pm 1 nightly    covid vaccine 2/26/21 2nd dose- no symptoms noted    Banquet meals lately and cheetos snacks increased    Prior to Visit Medications    Medication Sig Taking? Authorizing Provider   simvastatin (ZOCOR) 40 MG tablet TAKE 1 TABLET NIGHTLY  Brent Contreras MD   IMBRUVICA 420 MG tablet TAKE ONE TABLET BY MOUTH ONCE DAILY. MAY BE TAKEN WITH OR WITHOUT FOOD. DO NOT CRUSH OR CHEW TABLETS.   Historical Provider, MD   sulfamethoxazole-trimethoprim (BACTRIM DS;SEPTRA DS) 800-160 MG per tablet TAKE 1 TABLET BY MOUTH ON [address]  Historical Provider, MD   acyclovir (ZOVIRAX) 400 MG tablet   Historical Provider, MD   levothyroxine (SYNTHROID) 125 MCG tablet Take 1 tablet by mouth Daily  Sarai Love MD   lisinopril (PRINIVIL;ZESTRIL) 5 MG tablet Take 1 tablet by mouth once daily  Sarai Love MD   omeprazole (PRILOSEC) 20 MG delayed release capsule Take 1 capsule by mouth once daily  Sarai Love MD   Inulin (FIBER CHOICE PO) Take by mouth  Historical Provider, MD Multiple Vitamins-Minerals (MULTIVITAMIN ADULT PO) Take by mouth 2 times daily  Historical Provider, MD   docusate sodium (DULCOLAX) 100 MG capsule Take 100 mg by mouth daily  Historical Provider, MD   Calcium Carb-Cholecalciferol (CALTRATE 600+D) 600-800 MG-UNIT TABS Take by mouth 2 times daily  Historical Provider, MD   sertraline (ZOLOFT) 25 MG tablet Take 25 mg by mouth daily  Historical Provider, MD       Allergies   Allergen Reactions    Celebrex [Celecoxib] Nausea Only     Upset stomach       Past Medical History:   Diagnosis Date    Osteopenia      Past Surgical History:   Procedure Laterality Date    CATARACT REMOVAL      GALLBLADDER SURGERY      HYSTERECTOMY, VAGINAL       Social History     Socioeconomic History    Marital status:       Spouse name: Not on file    Number of children: Not on file    Years of education: Not on file    Highest education level: Not on file   Occupational History    Not on file   Social Needs    Financial resource strain: Not on file    Food insecurity     Worry: Not on file     Inability: Not on file    Transportation needs     Medical: Not on file     Non-medical: Not on file   Tobacco Use    Smoking status: Never Smoker    Smokeless tobacco: Never Used   Substance and Sexual Activity    Alcohol use: No    Drug use: Not on file    Sexual activity: Not on file   Lifestyle    Physical activity     Days per week: Not on file     Minutes per session: Not on file    Stress: Not on file   Relationships    Social connections     Talks on phone: Not on file     Gets together: Not on file     Attends Mormon service: Not on file     Active member of club or organization: Not on file     Attends meetings of clubs or organizations: Not on file     Relationship status: Not on file    Intimate partner violence     Fear of current or ex partner: Not on file     Emotionally abused: Not on file     Physically abused: Not on file MCV 92.7 02/22/2021    .5 02/22/2021       Assessment:      Diagnosis Orders   1. Essential hypertension  dilTIAZem (CARTIA XT) 180 MG extended release capsule    lisinopril (PRINIVIL;ZESTRIL) 20 MG tablet   2. Anxiety       No results found for this visit on 03/03/21.    :     Return in about 1 week (around 3/10/2021) for HTN urgency. Patient Instructions   Limit salt intake. No orders of the defined types were placed in this encounter.       Electronically signed by Josefina Tinoco MD on 3/3/2021 at9:15 AM

## 2021-03-10 ENCOUNTER — OFFICE VISIT (OUTPATIENT)
Dept: FAMILY MEDICINE CLINIC | Age: 86
End: 2021-03-10
Payer: MEDICARE

## 2021-03-10 VITALS
HEART RATE: 64 BPM | SYSTOLIC BLOOD PRESSURE: 200 MMHG | OXYGEN SATURATION: 97 % | BODY MASS INDEX: 32.86 KG/M2 | DIASTOLIC BLOOD PRESSURE: 80 MMHG | WEIGHT: 142 LBS | HEIGHT: 55 IN

## 2021-03-10 DIAGNOSIS — I10 ESSENTIAL HYPERTENSION: Primary | ICD-10-CM

## 2021-03-10 PROCEDURE — G8417 CALC BMI ABV UP PARAM F/U: HCPCS | Performed by: FAMILY MEDICINE

## 2021-03-10 PROCEDURE — G8484 FLU IMMUNIZE NO ADMIN: HCPCS | Performed by: FAMILY MEDICINE

## 2021-03-10 PROCEDURE — G8427 DOCREV CUR MEDS BY ELIG CLIN: HCPCS | Performed by: FAMILY MEDICINE

## 2021-03-10 PROCEDURE — 99213 OFFICE O/P EST LOW 20 MIN: CPT | Performed by: FAMILY MEDICINE

## 2021-03-10 PROCEDURE — 1090F PRES/ABSN URINE INCON ASSESS: CPT | Performed by: FAMILY MEDICINE

## 2021-03-10 PROCEDURE — 1036F TOBACCO NON-USER: CPT | Performed by: FAMILY MEDICINE

## 2021-03-10 PROCEDURE — 4040F PNEUMOC VAC/ADMIN/RCVD: CPT | Performed by: FAMILY MEDICINE

## 2021-03-10 PROCEDURE — 1123F ACP DISCUSS/DSCN MKR DOCD: CPT | Performed by: FAMILY MEDICINE

## 2021-03-10 PROCEDURE — 99211 OFF/OP EST MAY X REQ PHY/QHP: CPT | Performed by: FAMILY MEDICINE

## 2021-03-10 RX ORDER — DILTIAZEM HYDROCHLORIDE 240 MG/1
240 CAPSULE, COATED, EXTENDED RELEASE ORAL DAILY
Qty: 30 CAPSULE | Refills: 1 | Status: SHIPPED | OUTPATIENT
Start: 2021-03-10 | End: 2021-05-05 | Stop reason: SDUPTHER

## 2021-03-10 ASSESSMENT — ENCOUNTER SYMPTOMS: SHORTNESS OF BREATH: 0

## 2021-03-10 NOTE — PROGRESS NOTES
[address] [facility]  Dept: [phone]  Dept Fax:[phone]    Zach Lopez is a 80 y.o. female who presents today for her medical conditions/complaints as noted below. Zach Lopez is c/o of Hypertension (one week f/u)      HPI:     HPI  Here for follow up of HTN, not seeing well, very aggitated with systolic number remaining elevated. Taking all medications regularly  No side effects noted    other complaint currently anxiety over covid  Pt here with her daughter      BP Readings from Last 3 Encounters:   03/10/21 (!) 200/80   03/03/21 (!) 226/112   01/06/21 118/80          (goal 120/80)    Past Medical History:   Diagnosis Date    Osteopenia       Past Surgical History:   Procedure Laterality Date    CATARACT REMOVAL      GALLBLADDER SURGERY      HYSTERECTOMY, VAGINAL         Family History   Problem Relation Age of Onset    Stroke Mother     Stroke Father        Social History     Tobacco Use    Smoking status: Never Smoker    Smokeless tobacco: Never Used   Substance Use Topics    Alcohol use: No      Current Outpatient Medications   Medication Sig Dispense Refill    dilTIAZem (CARTIA XT) 240 MG extended release capsule Take 1 capsule by mouth daily 30 capsule 1    lisinopril (PRINIVIL;ZESTRIL) 20 MG tablet Take 1 tablet by mouth once daily 30 tablet 3    simvastatin (ZOCOR) 40 MG tablet TAKE 1 TABLET NIGHTLY 90 tablet 3    IMBRUVICA 420 MG tablet TAKE ONE TABLET BY MOUTH ONCE DAILY. MAY BE TAKEN WITH OR WITHOUT FOOD. DO NOT CRUSH OR CHEW TABLETS.       sulfamethoxazole-trimethoprim (BACTRIM DS;SEPTRA DS) 800-160 MG per tablet TAKE 1 TABLET BY MOUTH ON MONDAY WEDNESDAY AND FRIDAY      acyclovir (ZOVIRAX) 400 MG tablet       levothyroxine (SYNTHROID) 125 MCG tablet Take 1 tablet by mouth Daily 90 tablet 1    omeprazole (PRILOSEC) 20 MG delayed release capsule Take 1 capsule by mouth once daily 90 capsule 1  Inulin (FIBER CHOICE PO) Take by mouth      Multiple Vitamins-Minerals (MULTIVITAMIN ADULT PO) Take by mouth 2 times daily      docusate sodium (DULCOLAX) 100 MG capsule Take 100 mg by mouth daily      Calcium Carb-Cholecalciferol (CALTRATE 600+D) 600-800 MG-UNIT TABS Take by mouth 2 times daily       No current facility-administered medications for this visit. Allergies   Allergen Reactions    Celebrex [Celecoxib] Nausea Only     Upset stomach       Health Maintenance   Topic Date Due    Annual Wellness Visit (AWV)  Never done    Lipid screen  01/02/2022    TSH testing  01/28/2022    Potassium monitoring  02/22/2022    Creatinine monitoring  02/22/2022    DTaP/Tdap/Td vaccine (2 - Td) 10/09/2028    Flu vaccine  Completed    Shingles Vaccine  Completed    Pneumococcal 65+ yrs at Risk Vaccine  Completed    COVID-19 Vaccine  Completed    Hepatitis A vaccine  Aged Out    Hepatitis B vaccine  Aged Out    Hib vaccine  Aged Out    Meningococcal (ACWY) vaccine  Aged Out       Subjective:      Review of Systems   Constitutional: Negative for fatigue. Had an infusion yesterday and reading was 170/66  187/78  180/80    States today her machine read in the 200s   Respiratory: Negative for shortness of breath. Cardiovascular: Negative for chest pain, palpitations and leg swelling. Genitourinary: Negative for frequency. Neurological: Positive for headaches (every now and than but takes ibuprofen and that helps). Negative for dizziness. Home BP Checks? yes  Medication Compliant? yes    Objective:     BP (!) 200/80 (Site: Right Upper Arm, Position: Sitting, Cuff Size: Large Adult)   Pulse 64   Ht 4' 6\" (1.372 m)   Wt 142 lb (64.4 kg)   SpO2 97%   BMI 34.24 kg/m²   Physical Exam  Vitals signs reviewed. Constitutional:       General: She is not in acute distress. Appearance: She is well-developed. HENT:      Head: Atraumatic. Neck:      Musculoskeletal: Neck supple. Thyroid: No thyromegaly. Vascular: No carotid bruit. Cardiovascular:      Rate and Rhythm: Normal rate and regular rhythm. Heart sounds: No murmur. Pulmonary:      Effort: Pulmonary effort is normal.      Breath sounds: Normal breath sounds. Musculoskeletal:         General: No swelling (BLE). Neurological:      Mental Status: She is alert and oriented to person, place, and time. Assessment:      1. Essential hypertension               Plan:     Patient Instructions   Monitor bp at home couple times prior to next follow up    No orders of the defined types were placed in this encounter. Orders Placed This Encounter   Medications    dilTIAZem (CARTIA XT) 240 MG extended release capsule     Sig: Take 1 capsule by mouth daily     Dispense:  30 capsule     Refill:  1     In place of 180 mg tabs      Pt reassured as long as doing well no further concern for worry, monitor when symptoms are noted. Reviewed pt anxiety over covid situation also. Return in about 2 weeks (around 3/24/2021) for HTN. Discussed use, benefit, and side effects of prescribed medications. All patient questions answered. Pt voiced understanding. Patient agreed with treatment plan. Follow up as directed.      Electronically signedby Helen Bang MD on 3/10/2021

## 2021-03-23 LAB
ALBUMIN/GLOBULIN RATIO: 1.6 G/DL
ALBUMIN: 4.1 G/DL (ref 3.5–5)
ALP BLD-CCNC: 65 UNITS/L (ref 38–126)
ALT SERPL-CCNC: 18 UNITS/L (ref 4–35)
ANION GAP SERPL CALCULATED.3IONS-SCNC: 12.6 MMOL/L
AST SERPL-CCNC: 33 UNITS/L (ref 14–36)
BASOPHILS %: 1.89 (ref 0–3)
BASOPHILS ABSOLUTE: 0.12 (ref 0–0.3)
BILIRUB SERPL-MCNC: 0.3 MG/DL (ref 0.2–1.3)
BUN BLDV-MCNC: 18 MG/DL (ref 7–17)
CALCIUM SERPL-MCNC: 8.9 MG/DL (ref 8.4–10.2)
CHLORIDE BLD-SCNC: 91 MMOL/L (ref 98–120)
CO2: 24 MMOL/L (ref 22–31)
CREAT SERPL-MCNC: 0.9 MG/DL (ref 0.5–1)
CREATININE CLEARANCE: 28.47
EOSINOPHILS %: 0.55 (ref 0–10)
EOSINOPHILS ABSOLUTE: 0.03 (ref 0–1.1)
GFR CALCULATED: > 60
GLOBULIN: 2.6 G/DL
GLUCOSE: 91 MG/DL (ref 65–105)
HCT VFR BLD CALC: 37.8 % (ref 37–47)
HEMOGLOBIN: 12.5 (ref 12–16)
LACTATE DEHYDROGENASE: 600 UNITS/L (ref 313–618)
LYMPHOCYTE %: 62.83 (ref 20–51.1)
LYMPHOCYTES ABSOLUTE: 3.97 (ref 1–5.5)
MCH RBC QN AUTO: 29.7 PG (ref 28.5–32.5)
MCHC RBC AUTO-ENTMCNC: 33 G/DL (ref 32–37)
MCV RBC AUTO: 90 FL (ref 80–94)
MONOCYTES %: 10.09 (ref 1.7–9.3)
MONOCYTES ABSOLUTE: 0.64 (ref 0.1–1)
NEUTROPHILS %: 24.64 (ref 42.2–75.2)
NEUTROPHILS ABSOLUTE: 1.56 (ref 2–8.1)
PDW BLD-RTO: 12.3 % (ref 8.5–15.5)
PLATELET # BLD: 196.9 THOU/MM3 (ref 130–400)
POTASSIUM SERPL-SCNC: 4.6 MMOL/L (ref 3.6–5)
RBC: 4.19 M/UL (ref 4.2–5.4)
SODIUM BLD-SCNC: 123 MMOL/L (ref 135–145)
TOTAL PROTEIN, SERUM: 6.7 G/DL (ref 6.3–8.2)
WBC: 6.3 THOU/ML3 (ref 4.8–10.8)

## 2021-03-24 ENCOUNTER — OFFICE VISIT (OUTPATIENT)
Dept: FAMILY MEDICINE CLINIC | Age: 86
End: 2021-03-24
Payer: MEDICARE

## 2021-03-24 VITALS
BODY MASS INDEX: 32.79 KG/M2 | OXYGEN SATURATION: 98 % | HEART RATE: 50 BPM | SYSTOLIC BLOOD PRESSURE: 190 MMHG | DIASTOLIC BLOOD PRESSURE: 80 MMHG | WEIGHT: 141.7 LBS | TEMPERATURE: 98.2 F | HEIGHT: 55 IN

## 2021-03-24 DIAGNOSIS — I10 ESSENTIAL HYPERTENSION: Primary | ICD-10-CM

## 2021-03-24 PROCEDURE — 1123F ACP DISCUSS/DSCN MKR DOCD: CPT | Performed by: FAMILY MEDICINE

## 2021-03-24 PROCEDURE — 99211 OFF/OP EST MAY X REQ PHY/QHP: CPT | Performed by: FAMILY MEDICINE

## 2021-03-24 PROCEDURE — 99213 OFFICE O/P EST LOW 20 MIN: CPT | Performed by: FAMILY MEDICINE

## 2021-03-24 PROCEDURE — 1090F PRES/ABSN URINE INCON ASSESS: CPT | Performed by: FAMILY MEDICINE

## 2021-03-24 PROCEDURE — 1036F TOBACCO NON-USER: CPT | Performed by: FAMILY MEDICINE

## 2021-03-24 PROCEDURE — G8417 CALC BMI ABV UP PARAM F/U: HCPCS | Performed by: FAMILY MEDICINE

## 2021-03-24 PROCEDURE — G8427 DOCREV CUR MEDS BY ELIG CLIN: HCPCS | Performed by: FAMILY MEDICINE

## 2021-03-24 PROCEDURE — G8484 FLU IMMUNIZE NO ADMIN: HCPCS | Performed by: FAMILY MEDICINE

## 2021-03-24 PROCEDURE — 4040F PNEUMOC VAC/ADMIN/RCVD: CPT | Performed by: FAMILY MEDICINE

## 2021-03-24 RX ORDER — LISINOPRIL AND HYDROCHLOROTHIAZIDE 20; 12.5 MG/1; MG/1
1 TABLET ORAL EVERY MORNING
Qty: 30 TABLET | Refills: 3 | Status: SHIPPED | OUTPATIENT
Start: 2021-03-24 | End: 2021-07-12 | Stop reason: SDUPTHER

## 2021-03-24 ASSESSMENT — ENCOUNTER SYMPTOMS: SHORTNESS OF BREATH: 0

## 2021-03-24 NOTE — PROGRESS NOTES
[address] [facility]  Dept: [phone]  Dept Fax:[phone]    Don Roldan is a 80 y.o. female who presents today for her medical conditions/complaints as noted below. Don Roldan is c/o of Hypertension    HPI:     HPI   Pt here to review HTN,   Occasionally feeling pulse in neck    Rarely feeling slightly lightheaded. One day as low as 160 to 212/70-80's    BP Readings from Last 3 Encounters:   03/24/21 (!) 190/80   03/10/21 (!) 200/80   03/03/21 (!) 226/112          (goal 120/80)    Past Medical History:   Diagnosis Date    Osteopenia       Past Surgical History:   Procedure Laterality Date    CATARACT REMOVAL      GALLBLADDER SURGERY      HYSTERECTOMY, VAGINAL         Family History   Problem Relation Age of Onset    Stroke Mother     Stroke Father        Social History     Tobacco Use    Smoking status: Never Smoker    Smokeless tobacco: Never Used   Substance Use Topics    Alcohol use: No      Current Outpatient Medications   Medication Sig Dispense Refill    lisinopril-hydroCHLOROthiazide (PRINZIDE;ZESTORETIC) 20-12.5 MG per tablet Take 1 tablet by mouth every morning 30 tablet 3    dilTIAZem (CARTIA XT) 240 MG extended release capsule Take 1 capsule by mouth daily 30 capsule 1    lisinopril (PRINIVIL;ZESTRIL) 20 MG tablet Take 1 tablet by mouth once daily 30 tablet 3    simvastatin (ZOCOR) 40 MG tablet TAKE 1 TABLET NIGHTLY 90 tablet 3    IMBRUVICA 420 MG tablet TAKE ONE TABLET BY MOUTH ONCE DAILY. MAY BE TAKEN WITH OR WITHOUT FOOD. DO NOT CRUSH OR CHEW TABLETS.       sulfamethoxazole-trimethoprim (BACTRIM DS;SEPTRA DS) 800-160 MG per tablet TAKE 1 TABLET BY MOUTH ON MONDAY WEDNESDAY AND FRIDAY      acyclovir (ZOVIRAX) 400 MG tablet       levothyroxine (SYNTHROID) 125 MCG tablet Take 1 tablet by mouth Daily 90 tablet 1    omeprazole (PRILOSEC) 20 MG delayed release capsule Take 1 capsule by mouth once daily 90 capsule 1    Inulin (FIBER CHOICE PO) Take by mouth      Multiple Vitamins-Minerals (MULTIVITAMIN ADULT PO) Take by mouth 2 times daily      docusate sodium (DULCOLAX) 100 MG capsule Take 100 mg by mouth daily      Calcium Carb-Cholecalciferol (CALTRATE 600+D) 600-800 MG-UNIT TABS Take by mouth 2 times daily       No current facility-administered medications for this visit. Allergies   Allergen Reactions    Celebrex [Celecoxib] Nausea Only     Upset stomach       Health Maintenance   Topic Date Due    Annual Wellness Visit (AWV)  Never done    Lipid screen  01/02/2022    TSH testing  01/28/2022    Potassium monitoring  03/23/2022    Creatinine monitoring  03/23/2022    DTaP/Tdap/Td vaccine (2 - Td) 10/09/2028    Flu vaccine  Completed    Shingles Vaccine  Completed    Pneumococcal 65+ yrs at Risk Vaccine  Completed    COVID-19 Vaccine  Completed    Hepatitis A vaccine  Aged Out    Hepatitis B vaccine  Aged Out    Hib vaccine  Aged Out    Meningococcal (ACWY) vaccine  Aged Out       Subjective:      Review of Systems   Constitutional: Negative for fatigue. Respiratory: Negative for shortness of breath. Cardiovascular: Negative for chest pain, palpitations (says she can feel her pulse in her neck at some points during the day) and leg swelling. Neurological: Positive for light-headedness (every once in a while). Negative for dizziness and headaches. Home BP Checks? yes  Medication Compliant? yes    Objective:     BP (!) 190/80 (Site: Left Upper Arm, Position: Sitting, Cuff Size: Medium Adult)   Pulse 50   Temp 98.2 °F (36.8 °C)   Ht 4' 6\" (1.372 m)   Wt 141 lb 11.2 oz (64.3 kg)   SpO2 98%   BMI 34.17 kg/m²   Physical Exam  Vitals signs reviewed. HENT:      Head: Normocephalic. Eyes:      Conjunctiva/sclera: Conjunctivae normal.   Cardiovascular:      Rate and Rhythm: Normal rate and regular rhythm.    Pulmonary:      Effort: Pulmonary effort is normal. Musculoskeletal:         General: No swelling. Neurological:      Mental Status: She is alert. Psychiatric:         Thought Content: Thought content normal.         Judgment: Judgment normal.      Comments: Pt much less anxious about HTN than prior visit. Still reports feeling very good overall. Noted mild bradycardia with improved BP control    Assessment:      1. Essential hypertension                     Plan:     Patient Instructions   Encourage taking thyroid alone at night when awakens for bathroom  Take BP medications in morning    No orders of the defined types were placed in this encounter. Orders Placed This Encounter   Medications    lisinopril-hydroCHLOROthiazide (PRINZIDE;ZESTORETIC) 20-12.5 MG per tablet     Sig: Take 1 tablet by mouth every morning     Dispense:  30 tablet     Refill:  3     In place of lisinopril        Return in about 2 weeks (around 4/7/2021) for HTN. Discussed use, benefit, and side effects of prescribed medications. All patient questions answered. Pt voiced understanding. Reviewed health maintenance. Instructed to continue current medications, diet and exercise. Patient agreed with treatment plan. Follow up as directed.      Electronically signedby Quinn Alva MD on 3/24/2021

## 2021-04-02 ENCOUNTER — TELEPHONE (OUTPATIENT)
Dept: FAMILY MEDICINE CLINIC | Age: 86
End: 2021-04-02

## 2021-04-02 NOTE — TELEPHONE ENCOUNTER
[facility] called today stating she was very confused on what medications she was suppose to be taking, there was two types of lisinopril, according to  last note 3/24/21 she was to stop taking the lisinopril and start taking the lisinopril- hctz and there was two different types of cartia  one mg was 180 and the other was 240 according to the med list it says to take the 240 in place of the 180 mg. Just wanted to let  know. We did clear this up and she understands what she needs to take now.

## 2021-04-07 ENCOUNTER — OFFICE VISIT (OUTPATIENT)
Dept: FAMILY MEDICINE CLINIC | Age: 86
End: 2021-04-07
Payer: COMMERCIAL

## 2021-04-07 VITALS
SYSTOLIC BLOOD PRESSURE: 122 MMHG | BODY MASS INDEX: 32.4 KG/M2 | OXYGEN SATURATION: 98 % | DIASTOLIC BLOOD PRESSURE: 80 MMHG | HEART RATE: 74 BPM | WEIGHT: 140 LBS | HEIGHT: 55 IN

## 2021-04-07 DIAGNOSIS — I10 ESSENTIAL HYPERTENSION: Primary | ICD-10-CM

## 2021-04-07 DIAGNOSIS — D80.1 HYPOGAMMAGLOBULINEMIA (HCC): ICD-10-CM

## 2021-04-07 PROCEDURE — 99213 OFFICE O/P EST LOW 20 MIN: CPT | Performed by: FAMILY MEDICINE

## 2021-04-07 PROCEDURE — 1036F TOBACCO NON-USER: CPT | Performed by: FAMILY MEDICINE

## 2021-04-07 PROCEDURE — G8427 DOCREV CUR MEDS BY ELIG CLIN: HCPCS | Performed by: FAMILY MEDICINE

## 2021-04-07 PROCEDURE — 1123F ACP DISCUSS/DSCN MKR DOCD: CPT | Performed by: FAMILY MEDICINE

## 2021-04-07 PROCEDURE — 4040F PNEUMOC VAC/ADMIN/RCVD: CPT | Performed by: FAMILY MEDICINE

## 2021-04-07 PROCEDURE — 99211 OFF/OP EST MAY X REQ PHY/QHP: CPT | Performed by: FAMILY MEDICINE

## 2021-04-07 PROCEDURE — G8417 CALC BMI ABV UP PARAM F/U: HCPCS | Performed by: FAMILY MEDICINE

## 2021-04-07 PROCEDURE — 1090F PRES/ABSN URINE INCON ASSESS: CPT | Performed by: FAMILY MEDICINE

## 2021-04-07 ASSESSMENT — ENCOUNTER SYMPTOMS: SHORTNESS OF BREATH: 0

## 2021-04-07 NOTE — PROGRESS NOTES
[address] [facility]  Dept: [phone]  Dept Fax:[phone]    Jaciel Lara is a 80 y.o. female who presents today for her medical conditions/complaints as noted below. Jaciel Lara is c/o of Hypertension      HPI:     HPI  Here for follow up of HTN  Taking all medications regularly  No side effects noted    No other complaint currently, taking in 40-50 oz water daily. BPs at home 121-131/76-84    Occasional pm 160-180/80's      BP Readings from Last 3 Encounters:   04/07/21 122/80   03/24/21 (!) 190/80   03/10/21 (!) 200/80          (goal 120/80)    Past Medical History:   Diagnosis Date    Osteopenia       Past Surgical History:   Procedure Laterality Date    CATARACT REMOVAL      GALLBLADDER SURGERY      HYSTERECTOMY, VAGINAL         Family History   Problem Relation Age of Onset    Stroke Mother     Stroke Father        Social History     Tobacco Use    Smoking status: Never Smoker    Smokeless tobacco: Never Used   Substance Use Topics    Alcohol use: No      Current Outpatient Medications   Medication Sig Dispense Refill    lisinopril-hydroCHLOROthiazide (PRINZIDE;ZESTORETIC) 20-12.5 MG per tablet Take 1 tablet by mouth every morning 30 tablet 3    dilTIAZem (CARTIA XT) 240 MG extended release capsule Take 1 capsule by mouth daily 30 capsule 1    simvastatin (ZOCOR) 40 MG tablet TAKE 1 TABLET NIGHTLY 90 tablet 3    IMBRUVICA 420 MG tablet TAKE ONE TABLET BY MOUTH ONCE DAILY. MAY BE TAKEN WITH OR WITHOUT FOOD. DO NOT CRUSH OR CHEW TABLETS.       sulfamethoxazole-trimethoprim (BACTRIM DS;SEPTRA DS) 800-160 MG per tablet TAKE 1 TABLET BY MOUTH ON MONDAY WEDNESDAY AND FRIDAY      acyclovir (ZOVIRAX) 400 MG tablet       levothyroxine (SYNTHROID) 125 MCG tablet Take 1 tablet by mouth Daily 90 tablet 1    omeprazole (PRILOSEC) 20 MG delayed release capsule Take 1 capsule by mouth once daily 90 capsule 1    Inulin Pulmonary:      Effort: Pulmonary effort is normal. No respiratory distress. Musculoskeletal:         General: No swelling. Lymphadenopathy:      Cervical: No cervical adenopathy. Neurological:      Mental Status: She is alert. Psychiatric:         Thought Content: Thought content normal.         Judgment: Judgment normal.           Assessment:      1. Essential hypertension    2. Hypogammaglobulinemia [facility])           Plan:     Patient Instructions   May attempt laying with legs elevated for 30 minutes about 2 hours prior to bed  May continue or increase fiber    No orders of the defined types were placed in this encounter. No orders of the defined types were placed in this encounter. Return in about 4 weeks (around 5/5/2021) for HTN. Discussed use, benefit, and side effects of prescribed medications. All patient questions answered. Pt voiced understanding. Instructed to continue current medications, diet and exercise. Patient agreed with treatment plan. Follow up as directed.      Electronically signedby Shira Crowder MD on 4/7/2021

## 2021-04-07 NOTE — PATIENT INSTRUCTIONS
May attempt laying with legs elevated for 30 minutes about 2 hours prior to bed  May continue or increase fiber

## 2021-04-20 LAB
ALBUMIN/GLOBULIN RATIO: 1.68 G/DL
ALBUMIN: 3.7 G/DL (ref 3.5–5)
ALP BLD-CCNC: 63 UNITS/L (ref 38–126)
ALT SERPL-CCNC: 18 UNITS/L (ref 4–35)
ANION GAP SERPL CALCULATED.3IONS-SCNC: 14.6 MMOL/L
AST SERPL-CCNC: 28 UNITS/L (ref 14–36)
BANDED NEUTROPHILS RELATIVE PERCENT: 0 % (ref 0–5)
BASOPHILS %. MANUAL COUNT: 0 % (ref 0–1)
BILIRUB SERPL-MCNC: 0.1 MG/DL (ref 0.2–1.3)
BUN BLDV-MCNC: 31 MG/DL (ref 7–17)
CALCIUM SERPL-MCNC: 8.7 MG/DL (ref 8.4–10.2)
CHLORIDE BLD-SCNC: 91 MMOL/L (ref 98–120)
CO2: 21 MMOL/L (ref 22–31)
CREAT SERPL-MCNC: 1.2 MG/DL (ref 0.5–1)
CREATININE CLEARANCE: 23.72
EOSINOPHILS % MANUAL COUNT: 0 (ref 0–10)
FREE KAPPA LIGHT CHAINS: 0.94 MG/DL (ref 0.37–1.94)
FREE KAPPA/LAMBDA RATIO: 0.74 (ref 0.26–1.65)
FREE LAMBDA LIGHT CHAINS: 1.27 MG/DL (ref 0.57–2.63)
GFR CALCULATED: 45.2
GLOBULIN: 2.2 G/DL
GLUCOSE: 90 MG/DL (ref 65–105)
HCT VFR BLD CALC: 35.3 % (ref 37–47)
HEMOGLOBIN: 11.6 (ref 12–16)
LYMPHOCYTES % MANUAL COUNT: 82 % (ref 21–51)
MCH RBC QN AUTO: 29.8 PG (ref 28.5–32.5)
MCHC RBC AUTO-ENTMCNC: 32.8 G/DL (ref 32–37)
MCV RBC AUTO: 90.9 FL (ref 80–94)
MONOCYTES RELATIVE PERCENT: 4 % (ref 2–9)
NEUTROPHILS %. MANUAL COUNT: 14 % (ref 42–75)
PDW BLD-RTO: 13.2 % (ref 8.5–15.5)
PLATELET # BLD: 340.6 THOU/MM3 (ref 130–400)
POTASSIUM SERPL-SCNC: 4.6 MMOL/L (ref 3.6–5)
RBC: 3.89 M/UL (ref 4.2–5.4)
SODIUM BLD-SCNC: 122 MMOL/L (ref 135–145)
TOTAL PROTEIN, SERUM: 5.9 G/DL (ref 6.3–8.2)
WBC: 8.4 THOU/ML3 (ref 4.8–10.8)

## 2021-05-05 ENCOUNTER — OFFICE VISIT (OUTPATIENT)
Dept: FAMILY MEDICINE CLINIC | Age: 86
End: 2021-05-05
Payer: COMMERCIAL

## 2021-05-05 VITALS
OXYGEN SATURATION: 98 % | SYSTOLIC BLOOD PRESSURE: 120 MMHG | HEART RATE: 68 BPM | DIASTOLIC BLOOD PRESSURE: 74 MMHG | BODY MASS INDEX: 33.27 KG/M2 | WEIGHT: 138 LBS

## 2021-05-05 DIAGNOSIS — I10 ESSENTIAL HYPERTENSION: Primary | ICD-10-CM

## 2021-05-05 PROCEDURE — G8427 DOCREV CUR MEDS BY ELIG CLIN: HCPCS | Performed by: FAMILY MEDICINE

## 2021-05-05 PROCEDURE — 99212 OFFICE O/P EST SF 10 MIN: CPT

## 2021-05-05 PROCEDURE — 4040F PNEUMOC VAC/ADMIN/RCVD: CPT | Performed by: FAMILY MEDICINE

## 2021-05-05 PROCEDURE — 1090F PRES/ABSN URINE INCON ASSESS: CPT | Performed by: FAMILY MEDICINE

## 2021-05-05 PROCEDURE — 1036F TOBACCO NON-USER: CPT | Performed by: FAMILY MEDICINE

## 2021-05-05 PROCEDURE — 99212 OFFICE O/P EST SF 10 MIN: CPT | Performed by: FAMILY MEDICINE

## 2021-05-05 PROCEDURE — G8417 CALC BMI ABV UP PARAM F/U: HCPCS | Performed by: FAMILY MEDICINE

## 2021-05-05 PROCEDURE — 1123F ACP DISCUSS/DSCN MKR DOCD: CPT | Performed by: FAMILY MEDICINE

## 2021-05-05 PROCEDURE — 99213 OFFICE O/P EST LOW 20 MIN: CPT | Performed by: FAMILY MEDICINE

## 2021-05-05 RX ORDER — DILTIAZEM HYDROCHLORIDE 240 MG/1
240 CAPSULE, COATED, EXTENDED RELEASE ORAL DAILY
Qty: 90 CAPSULE | Refills: 1 | Status: SHIPPED | OUTPATIENT
Start: 2021-05-05

## 2021-05-05 ASSESSMENT — ENCOUNTER SYMPTOMS
CHEST TIGHTNESS: 0
SHORTNESS OF BREATH: 0

## 2021-05-05 NOTE — PROGRESS NOTES
[address] [address]  Dept: [phone]  Dept Fax: [phone]    See Lepe is a 80 y.o. female who presents today for her medical conditions/complaints as noted below. Paulina Barrera c/o of Hypertension      HPI:     HPI  Here for follow up of HTN  Taking all medications regularly  No side effects noted    No other complaint currently  BP doing well 112/68 today rarely up though resolves. BP Readings from Last 3 Encounters:   05/05/21 120/74   04/07/21 122/80   03/24/21 (!) 190/80          (goal 120/80)    Past Medical History:   Diagnosis Date    Osteopenia       Past Surgical History:   Procedure Laterality Date    CATARACT REMOVAL      GALLBLADDER SURGERY      HYSTERECTOMY, VAGINAL         Family History   Problem Relation Age of Onset    Stroke Mother     Stroke Father        Social History     Tobacco Use    Smoking status: Never Smoker    Smokeless tobacco: Never Used   Substance Use Topics    Alcohol use: No      Prior to Visit Medications    Medication Sig Taking? Authorizing Provider   omeprazole (PRILOSEC) 20 MG delayed release capsule Take 1 capsule by mouth once daily Yes Jeevan Angeles MD   lisinopril-hydroCHLOROthiazide (PRINZIDE;ZESTORETIC) 20-12.5 MG per tablet Take 1 tablet by mouth every morning Yes Jeevan Angeles MD   dilTIAZem (CARTIA XT) 240 MG extended release capsule Take 1 capsule by mouth daily Yes Jeevan Angeles MD   simvastatin (ZOCOR) 40 MG tablet TAKE 1 TABLET NIGHTLY Yes Jeevan Angeles MD   IMBRUVICA 420 MG tablet TAKE ONE TABLET BY MOUTH ONCE DAILY. MAY BE TAKEN WITH OR WITHOUT FOOD. DO NOT CRUSH OR CHEW TABLETS.  Yes Historical Provider, MD   sulfamethoxazole-trimethoprim (BACTRIM DS;SEPTRA DS) 800-160 MG per tablet TAKE 1 TABLET BY MOUTH ON [address] Yes Historical Provider, MD   levothyroxine (SYNTHROID) 125 MCG tablet Take 1 tablet by mouth Daily Yes Jeevan Angeles MD Inulin (FIBER CHOICE PO) Take by mouth Yes Historical Provider, MD   Multiple Vitamins-Minerals (MULTIVITAMIN ADULT PO) Take by mouth 2 times daily Yes Historical Provider, MD   Calcium Carb-Cholecalciferol (CALTRATE 600+D) 600-800 MG-UNIT TABS Take by mouth 2 times daily Yes Historical Provider, MD   acyclovir (ZOVIRAX) 400 MG tablet   Historical Provider, MD   docusate sodium (DULCOLAX) 100 MG capsule Take 100 mg by mouth daily  Historical Provider, MD     Allergies   Allergen Reactions    Celebrex [Celecoxib] Nausea Only     Upset stomach       Health Maintenance   Topic Date Due    Lipid screen  01/02/2022    TSH testing  01/28/2022    Potassium monitoring  04/20/2022    Creatinine monitoring  04/20/2022    DTaP/Tdap/Td vaccine (2 - Td) 10/02/2028    Flu vaccine  Completed    Shingles Vaccine  Completed    Pneumococcal 65+ yrs at Risk Vaccine  Completed    COVID-19 Vaccine  Completed    Hepatitis A vaccine  Aged Out    Hepatitis B vaccine  Aged Out    Hib vaccine  Aged Out    Meningococcal (ACWY) vaccine  Aged Out       Subjective:      Review of Systems   Respiratory: Negative for chest tightness and shortness of breath. Cardiovascular: Negative for chest pain, palpitations and leg swelling. Neurological: Positive for light-headedness. Objective:     /74 (Site: Left Upper Arm, Position: Sitting)   Pulse 68   Wt 138 lb (62.6 kg)   SpO2 98%   BMI 33.27 kg/m²     Physical Exam  Vitals signs reviewed. Constitutional:       General: She is not in acute distress. Appearance: She is not ill-appearing. HENT:      Head: Normocephalic. Eyes:      Conjunctiva/sclera: Conjunctivae normal.   Cardiovascular:      Rate and Rhythm: Normal rate and regular rhythm. Heart sounds: No murmur. Pulmonary:      Effort: Pulmonary effort is normal. No respiratory distress. Musculoskeletal:         General: No swelling. Neurological:      Mental Status: She is alert.    Psychiatric: Thought Content: Thought content normal.         Judgment: Judgment normal.     BP charts reviewed    Assessment:     1. Essential hypertension      No results found for this visit on 05/05/21. Plan:   No orders of the defined types were placed in this encounter. No follow-ups on file. There are no Patient Instructions on file for this visit. Discussed use, benefit, and side effects of prescribed medications. All patient questions answered. Pt voiced understanding. Patient agreed with treatment plan. Follow up as directed.      Electronically signed by Lukas James MD on 5/5/2021

## 2021-05-05 NOTE — PATIENT INSTRUCTIONS
When monitoring BP wait at least 3 minutes before checking  If TOP consistantly over 180 or under 90 - call office  If BOTTOM consistantly over 100 or under 50- call office

## 2021-05-17 LAB
ALBUMIN/GLOBULIN RATIO: 2 G/DL
ALBUMIN: 4.3 G/DL (ref 3.5–5)
ALP BLD-CCNC: 56 UNITS/L (ref 38–126)
ALT SERPL-CCNC: 15 UNITS/L (ref 4–35)
ANION GAP SERPL CALCULATED.3IONS-SCNC: 11.2 MMOL/L
AST SERPL-CCNC: 28 UNITS/L (ref 14–36)
BANDED NEUTROPHILS RELATIVE PERCENT: 3 % (ref 0–5)
BASOPHILS %. MANUAL COUNT: 1 % (ref 0–1)
BILIRUB SERPL-MCNC: 0.4 MG/DL (ref 0.2–1.3)
BUN BLDV-MCNC: 17 MG/DL (ref 7–17)
CALCIUM SERPL-MCNC: 9.4 MG/DL (ref 8.4–10.2)
CHLORIDE BLD-SCNC: 91 MMOL/L (ref 98–120)
CO2: 26 MMOL/L (ref 22–31)
CREAT SERPL-MCNC: 0.9 MG/DL (ref 0.5–1)
EOSINOPHILS % MANUAL COUNT: 1 (ref 0–10)
GFR CALCULATED: > 60
GLOBULIN: 2.1 G/DL
GLUCOSE: 89 MG/DL (ref 65–105)
HCT VFR BLD CALC: 36.9 % (ref 37–47)
HEMOGLOBIN: 12 (ref 12–16)
LACTATE DEHYDROGENASE: 584 UNITS/L (ref 313–618)
LYMPHOCYTES % MANUAL COUNT: 58 % (ref 21–51)
MCH RBC QN AUTO: 30.7 PG (ref 28.5–32.5)
MCHC RBC AUTO-ENTMCNC: 32.5 G/DL (ref 32–37)
MCV RBC AUTO: 94.5 FL (ref 80–94)
MONOCYTES RELATIVE PERCENT: 10 % (ref 2–9)
NEUTROPHILS %. MANUAL COUNT: 27 % (ref 42–75)
PDW BLD-RTO: 14.4 % (ref 8.5–15.5)
PLATELET # BLD: 305.5 THOU/MM3 (ref 130–400)
POTASSIUM SERPL-SCNC: 4.2 MMOL/L (ref 3.6–5)
RBC: 3.9 M/UL (ref 4.2–5.4)
SODIUM BLD-SCNC: 124 MMOL/L (ref 135–145)
TOTAL PROTEIN, SERUM: 6.5 G/DL (ref 6.3–8.2)
WBC: 7.6 THOU/ML3 (ref 4.8–10.8)

## 2021-06-08 DIAGNOSIS — E03.9 ACQUIRED HYPOTHYROIDISM: ICD-10-CM

## 2021-06-08 RX ORDER — LEVOTHYROXINE SODIUM 0.12 MG/1
125 TABLET ORAL DAILY
Qty: 90 TABLET | Refills: 0 | Status: SHIPPED | OUTPATIENT
Start: 2021-06-08 | End: 2021-07-21 | Stop reason: SDUPTHER

## 2021-06-15 LAB
ALBUMIN/GLOBULIN RATIO: 1.6 G/DL
ALBUMIN: 4.2 G/DL (ref 3.5–5)
ALP BLD-CCNC: 54 UNITS/L (ref 38–126)
ALT SERPL-CCNC: 15 UNITS/L (ref 4–35)
ANION GAP SERPL CALCULATED.3IONS-SCNC: 15.1 MMOL/L
AST SERPL-CCNC: 27 UNITS/L (ref 14–36)
BASOPHILS %: 2.27 (ref 0–3)
BASOPHILS ABSOLUTE: 0.15 (ref 0–0.3)
BILIRUB SERPL-MCNC: 0.4 MG/DL (ref 0.2–1.3)
BUN BLDV-MCNC: 20 MG/DL (ref 7–17)
CALCIUM SERPL-MCNC: 8.5 MG/DL (ref 8.4–10.2)
CHLORIDE BLD-SCNC: 99 MMOL/L (ref 98–120)
CO2: 21 MMOL/L (ref 22–31)
CREAT SERPL-MCNC: 0.9 MG/DL (ref 0.5–1)
CREATININE CLEARANCE: 27.93
EOSINOPHILS %: 0.81 (ref 0–10)
EOSINOPHILS ABSOLUTE: 0.05 (ref 0–1.1)
GFR CALCULATED: > 60
GLOBULIN: 2.5 G/DL
GLUCOSE: 83 MG/DL (ref 65–105)
HCT VFR BLD CALC: 36.7 % (ref 37–47)
HEMOGLOBIN: 11.7 (ref 12–16)
LACTATE DEHYDROGENASE: 624 UNITS/L (ref 313–618)
LYMPHOCYTE %: 45.99 (ref 20–51.1)
LYMPHOCYTES ABSOLUTE: 3.06 (ref 1–5.5)
MCH RBC QN AUTO: 30.9 PG (ref 28.5–32.5)
MCHC RBC AUTO-ENTMCNC: 32 G/DL (ref 32–37)
MCV RBC AUTO: 96.4 FL (ref 80–94)
MONOCYTES %: 9.33 (ref 1.7–9.3)
MONOCYTES ABSOLUTE: 0.62 (ref 0.1–1)
NEUTROPHILS %: 41.61 (ref 42.2–75.2)
NEUTROPHILS ABSOLUTE: 2.77 (ref 2–8.1)
PDW BLD-RTO: 14.5 % (ref 8.5–15.5)
PLATELET # BLD: 233.9 THOU/MM3 (ref 130–400)
POTASSIUM SERPL-SCNC: 4.1 MMOL/L (ref 3.6–5)
RBC: 3.8 M/UL (ref 4.2–5.4)
SODIUM BLD-SCNC: 131 MMOL/L (ref 135–145)
TOTAL PROTEIN, SERUM: 6.7 G/DL (ref 6.3–8.2)
WBC: 6.7 THOU/ML3 (ref 4.8–10.8)

## 2021-07-12 DIAGNOSIS — I10 ESSENTIAL HYPERTENSION: ICD-10-CM

## 2021-07-12 RX ORDER — LISINOPRIL AND HYDROCHLOROTHIAZIDE 20; 12.5 MG/1; MG/1
1 TABLET ORAL EVERY MORNING
Qty: 30 TABLET | Refills: 3 | Status: SHIPPED | OUTPATIENT
Start: 2021-07-12 | End: 2021-08-12 | Stop reason: SDUPTHER

## 2021-07-12 NOTE — TELEPHONE ENCOUNTER
Estela Hall is requesting a refill on the following medication(s):  Requested Prescriptions     Pending Prescriptions Disp Refills    lisinopril-hydroCHLOROthiazide (PRINZIDE;ZESTORETIC) 20-12.5 MG per tablet 30 tablet 3     Sig: Take 1 tablet by mouth every morning       Last Visit Date (If Applicable):  2/6/1548    Next Visit Date:    Visit date not found

## 2021-07-14 LAB
ALBUMIN/GLOBULIN RATIO: 1.8 G/DL
ALBUMIN: 3.8 G/DL (ref 3.5–5)
ALP BLD-CCNC: 54 UNITS/L (ref 38–126)
ALT SERPL-CCNC: 13 UNITS/L (ref 4–35)
ANION GAP SERPL CALCULATED.3IONS-SCNC: 13 MMOL/L
AST SERPL-CCNC: 27 UNITS/L (ref 14–36)
BASOPHILS %: 2.53 (ref 0–3)
BASOPHILS ABSOLUTE: 0.16 (ref 0–0.3)
BILIRUB SERPL-MCNC: 0.6 MG/DL (ref 0.2–1.3)
BUN BLDV-MCNC: 19 MG/DL (ref 7–17)
CALCIUM SERPL-MCNC: 8.9 MG/DL (ref 8.4–10.2)
CHLORIDE BLD-SCNC: 95 MMOL/L (ref 98–120)
CO2: 25 MMOL/L (ref 22–31)
CREAT SERPL-MCNC: 1 MG/DL (ref 0.5–1)
CREATININE CLEARANCE: 27.93
EOSINOPHILS %: 0.83 (ref 0–10)
EOSINOPHILS ABSOLUTE: 0.05 (ref 0–1.1)
GFR CALCULATED: 55.6
GLOBULIN: 2.1 G/DL
GLUCOSE: 90 MG/DL (ref 65–105)
HCT VFR BLD CALC: 34.2 % (ref 37–47)
HEMOGLOBIN: 11.1 (ref 12–16)
LACTATE DEHYDROGENASE: 613 UNITS/L (ref 313–618)
LYMPHOCYTE %: 27.85 (ref 20–51.1)
LYMPHOCYTES ABSOLUTE: 1.73 (ref 1–5.5)
MCH RBC QN AUTO: 30.8 PG (ref 28.5–32.5)
MCHC RBC AUTO-ENTMCNC: 32.4 G/DL (ref 32–37)
MCV RBC AUTO: 95.1 FL (ref 80–94)
MONOCYTES %: 9.23 (ref 1.7–9.3)
MONOCYTES ABSOLUTE: 0.57 (ref 0.1–1)
NEUTROPHILS %: 59.56 (ref 42.2–75.2)
NEUTROPHILS ABSOLUTE: 3.71 (ref 2–8.1)
PDW BLD-RTO: 12.9 % (ref 8.5–15.5)
PLATELET # BLD: 163 THOU/MM3 (ref 130–400)
POTASSIUM SERPL-SCNC: 4 MMOL/L (ref 3.6–5)
RBC: 3.6 M/UL (ref 4.2–5.4)
SODIUM BLD-SCNC: 129 MMOL/L (ref 135–145)
TOTAL PROTEIN, SERUM: 5.9 G/DL (ref 6.3–8.2)
WBC: 6.2 THOU/ML3 (ref 4.8–10.8)

## 2021-07-21 ENCOUNTER — OFFICE VISIT (OUTPATIENT)
Dept: FAMILY MEDICINE CLINIC | Age: 86
End: 2021-07-21
Payer: MEDICARE

## 2021-07-21 VITALS
WEIGHT: 145 LBS | SYSTOLIC BLOOD PRESSURE: 130 MMHG | HEIGHT: 55 IN | BODY MASS INDEX: 33.56 KG/M2 | DIASTOLIC BLOOD PRESSURE: 70 MMHG | HEART RATE: 57 BPM | OXYGEN SATURATION: 98 %

## 2021-07-21 DIAGNOSIS — K08.109 FULL DENTURES: ICD-10-CM

## 2021-07-21 DIAGNOSIS — E03.9 ACQUIRED HYPOTHYROIDISM: ICD-10-CM

## 2021-07-21 DIAGNOSIS — Z97.2 FULL DENTURES: ICD-10-CM

## 2021-07-21 DIAGNOSIS — I10 ESSENTIAL HYPERTENSION: ICD-10-CM

## 2021-07-21 DIAGNOSIS — E87.1 HYPONATREMIA: ICD-10-CM

## 2021-07-21 DIAGNOSIS — C91.10 CLL (CHRONIC LYMPHOCYTIC LEUKEMIA) (HCC): ICD-10-CM

## 2021-07-21 DIAGNOSIS — Z00.00 ROUTINE GENERAL MEDICAL EXAMINATION AT A HEALTH CARE FACILITY: Primary | ICD-10-CM

## 2021-07-21 LAB
ANION GAP SERPL CALCULATED.3IONS-SCNC: 15 MMOL/L
CHLORIDE BLD-SCNC: 96 MMOL/L (ref 98–120)
CO2: 23 MMOL/L (ref 22–31)
CREAT SERPL-MCNC: 0.9 MG/DL (ref 0.5–1)
GFR CALCULATED: > 60
POTASSIUM SERPL-SCNC: 4 MMOL/L (ref 3.6–5)
SODIUM BLD-SCNC: 130 MMOL/L (ref 135–145)
TSH SERPL DL<=0.05 MIU/L-ACNC: 1.6 MIU/ML (ref 0.49–4.67)

## 2021-07-21 PROCEDURE — 1123F ACP DISCUSS/DSCN MKR DOCD: CPT | Performed by: FAMILY MEDICINE

## 2021-07-21 PROCEDURE — 99397 PER PM REEVAL EST PAT 65+ YR: CPT | Performed by: FAMILY MEDICINE

## 2021-07-21 PROCEDURE — 4040F PNEUMOC VAC/ADMIN/RCVD: CPT | Performed by: FAMILY MEDICINE

## 2021-07-21 PROCEDURE — G0438 PPPS, INITIAL VISIT: HCPCS | Performed by: FAMILY MEDICINE

## 2021-07-21 PROCEDURE — G0463 HOSPITAL OUTPT CLINIC VISIT: HCPCS | Performed by: FAMILY MEDICINE

## 2021-07-21 RX ORDER — LEVOTHYROXINE SODIUM 0.12 MG/1
125 TABLET ORAL DAILY
Qty: 90 TABLET | Refills: 1 | Status: SHIPPED | OUTPATIENT
Start: 2021-07-21 | End: 2021-09-02 | Stop reason: SDUPTHER

## 2021-07-21 ASSESSMENT — PATIENT HEALTH QUESTIONNAIRE - PHQ9
SUM OF ALL RESPONSES TO PHQ QUESTIONS 1-9: 0
SUM OF ALL RESPONSES TO PHQ9 QUESTIONS 1 & 2: 0
SUM OF ALL RESPONSES TO PHQ QUESTIONS 1-9: 0
2. FEELING DOWN, DEPRESSED OR HOPELESS: 0
SUM OF ALL RESPONSES TO PHQ QUESTIONS 1-9: 0
1. LITTLE INTEREST OR PLEASURE IN DOING THINGS: 0
SUM OF ALL RESPONSES TO PHQ QUESTIONS 1-9: 0
SUM OF ALL RESPONSES TO PHQ9 QUESTIONS 1 & 2: 0
2. FEELING DOWN, DEPRESSED OR HOPELESS: 0
1. LITTLE INTEREST OR PLEASURE IN DOING THINGS: 0

## 2021-07-21 ASSESSMENT — LIFESTYLE VARIABLES
AUDIT-C TOTAL SCORE: INCOMPLETE
AUDIT TOTAL SCORE: INCOMPLETE
HOW OFTEN DO YOU HAVE A DRINK CONTAINING ALCOHOL: 0
HOW OFTEN DO YOU HAVE A DRINK CONTAINING ALCOHOL: NEVER

## 2021-07-21 ASSESSMENT — ENCOUNTER SYMPTOMS
VOMITING: 0
NAUSEA: 0
ABDOMINAL PAIN: 0
SHORTNESS OF BREATH: 0

## 2021-07-21 NOTE — PATIENT INSTRUCTIONS
Encourage laying on couch with ankles above chest as able  copy of medical power of  to office may be part of living will  Personalized Preventive Plan for Meenakshi Kinney - 7/21/2021  Medicare offers a range of preventive health benefits. Some of the tests and screenings are paid in full while other may be subject to a deductible, co-insurance, and/or copay. Some of these benefits include a comprehensive review of your medical history including lifestyle, illnesses that may run in your family, and various assessments and screenings as appropriate. After reviewing your medical record and screening and assessments performed today your provider may have ordered immunizations, labs, imaging, and/or referrals for you. A list of these orders (if applicable) as well as your Preventive Care list are included within your After Visit Summary for your review. Other Preventive Recommendations:    · A preventive eye exam performed by an eye specialist is recommended every 1-2 years to screen for glaucoma; cataracts, macular degeneration, and other eye disorders. · A preventive dental visit is recommended every 6 months. · Try to get at least 150 minutes of exercise per week or 10,000 steps per day on a pedometer . · Order or download the FREE \"Exercise & Physical Activity: Your Everyday Guide\" from [employer]. Call 2-639.726.1076 or search [employer] online. · You need 8860-3409 mg of calcium and 9310-3967 IU of vitamin D per day. It is possible to meet your calcium requirement with diet alone, but a vitamin D supplement is usually necessary to meet this goal.  · When exposed to the sun, use a sunscreen that protects against both UVA and UVB radiation with an SPF of 30 or greater. Reapply every 2 to 3 hours or after sweating, drying off with a towel, or swimming. · Always wear a seat belt when traveling in a car.  Always wear a helmet when riding a bicycle or motorcycle.

## 2021-07-21 NOTE — PROGRESS NOTES
[address] [address] [place] [address]  Dept: [phone]  Dept Fax: [phone]    Manuel Edwards is a 80 y.o. female who presents today for her medical conditions/complaints as noted below. Manuel Edwards is c/o of Medicare AWV    HPI:     HPI  Pt here for annual medicare wellness evaluation. Known CLL, hypothyroid, HTN, lipid, carotids stable. Had shot at eye  yesterday still a little more blurry      BP Readings from Last 3 Encounters:   07/21/21 130/70   05/05/21 120/74   04/07/21 122/80          (goal 120/80)    Past Medical History:   Diagnosis Date    Osteopenia       Past Surgical History:   Procedure Laterality Date    CATARACT REMOVAL      GALLBLADDER SURGERY      HYSTERECTOMY, VAGINAL         Family History   Problem Relation Age of Onset    Stroke Mother     Stroke Father        Social History     Tobacco Use    Smoking status: Never Smoker    Smokeless tobacco: Never Used   Substance Use Topics    Alcohol use: No      Current Outpatient Medications   Medication Sig Dispense Refill    levothyroxine (SYNTHROID) 125 MCG tablet Take 1 tablet by mouth Daily 90 tablet 1    lisinopril-hydroCHLOROthiazide (PRINZIDE;ZESTORETIC) 20-12.5 MG per tablet Take 1 tablet by mouth every morning 30 tablet 3    dilTIAZem (CARTIA XT) 240 MG extended release capsule Take 1 capsule by mouth daily 90 capsule 1    omeprazole (PRILOSEC) 20 MG delayed release capsule Take 1 capsule by mouth once daily 90 capsule 1    simvastatin (ZOCOR) 40 MG tablet TAKE 1 TABLET NIGHTLY 90 tablet 3    IMBRUVICA 420 MG tablet TAKE ONE TABLET BY MOUTH ONCE DAILY. MAY BE TAKEN WITH OR WITHOUT FOOD. DO NOT CRUSH OR CHEW TABLETS.       sulfamethoxazole-trimethoprim (BACTRIM DS;SEPTRA DS) 800-160 MG per tablet TAKE 1 TABLET BY MOUTH ON MONDAY WEDNESDAY AND FRIDAY      acyclovir (ZOVIRAX) 400 MG tablet       Inulin (FIBER CHOICE PO) Take by mouth      Multiple Vitamins-Minerals (MULTIVITAMIN ADULT PO) Take by mouth 2 times daily      docusate sodium (DULCOLAX) 100 MG capsule Take 100 mg by mouth daily      Calcium Carb-Cholecalciferol (CALTRATE 600+D) 600-800 MG-UNIT TABS Take by mouth 2 times daily       No current facility-administered medications for this visit. Allergies   Allergen Reactions    Celebrex [Celecoxib] Nausea Only     Upset stomach       Health Maintenance   Topic Date Due    Annual Wellness Visit (AWV)  Never done    Flu vaccine (1) 09/01/2021    Lipid screen  01/02/2022    TSH testing  07/21/2022    Potassium monitoring  07/21/2022    Creatinine monitoring  07/21/2022    DTaP/Tdap/Td vaccine (2 - Td or Tdap) 10/02/2028    Shingles Vaccine  Completed    Pneumococcal 65+ yrs at Risk Vaccine  Completed    COVID-19 Vaccine  Completed    Hepatitis A vaccine  Aged Out    Hepatitis B vaccine  Aged Out    Hib vaccine  Aged Out    Meningococcal (ACWY) vaccine  Aged Out       Subjective:      Review of Systems   Constitutional: Negative for fatigue. Eyes: Negative for visual disturbance. Respiratory: Negative for shortness of breath. Cardiovascular: Positive for leg swelling. Negative for chest pain and palpitations. Gastrointestinal: Negative for abdominal pain, nausea and vomiting. Genitourinary: Negative for difficulty urinating. Neurological: Positive for headaches. Negative for dizziness. Psychiatric/Behavioral: Positive for sleep disturbance. The patient is not nervous/anxious. MINI-MENTAL STATUS EXAM (Shahana Oats)    What is the Year? Season? Date? Day? Month?   (indicate # correct)        4    Where are we: State? County? Town? Place?  Floor? (indicate # correct)        5    Name 3 objects and have pt repeat.                (indicate # correct)        3    Serial 7's or spell \"world\" backwards.                 (indicate # correct)        4    Recall 3 objects                (indicate # correct) 3    Patient names a pencil & a watch                (indicate # correct)        2    Read and obey \"Close your eyes\"                (indicate 1 if correct)     1    Copy intersecting pentagons                (indicate 1 if correct)     0    Write a sentence                (indicate 1 if correct)     1    Repeat \"no ifs, ands, or buts\"                (indicate 1 if correct)     0    Follow 3-stage command                (indicate # done correctly) 3                                            ------------  TOTAL SCORE   (max = 30)                  26      REFERENCE INFORMATION FOR THE CLINICIAN:    \"Low\" score    = 0-23  \"Normal\" score = 24-30        Objective:     /70 (Site: Left Upper Arm, Position: Sitting, Cuff Size: Medium Adult)   Pulse 57   Ht 4' 6\" (1.372 m)   Wt 145 lb (65.8 kg)   SpO2 98%   BMI 34.96 kg/m²   Physical Exam  Vitals reviewed. Constitutional:       General: She is not in acute distress. Appearance: She is well-developed. HENT:      Head: Atraumatic. Eyes:      Conjunctiva/sclera: Conjunctivae normal.   Neck:      Thyroid: No thyromegaly. Vascular: No carotid bruit. Cardiovascular:      Rate and Rhythm: Normal rate and regular rhythm. Heart sounds: No murmur heard. Pulmonary:      Effort: Pulmonary effort is normal.      Breath sounds: Normal breath sounds. Abdominal:      General: Bowel sounds are normal.      Palpations: Abdomen is soft. Musculoskeletal:         General: Swelling (BLE-trace to 1+ edema bilaterally) present. Cervical back: Neck supple. Neurological:      Mental Status: She is alert and oriented to person, place, and time. Psychiatric:         Thought Content: Thought content normal.         Judgment: Judgment normal.           Assessment:      1. Routine general medical examination at a health care facility    2. Hyponatremia    3. CLL (chronic lymphocytic leukemia) ([facility] [address]    4. Acquired hypothyroidism    5.  BMI 34.0-34.9,adult 6. Essential hypertension    7. Full dentures                     Plan:     Patient Instructions   Encourage laying on couch with ankles above chest as able  copy of medical power of  to office may be part of living will  Personalized Preventive Plan for Krystin James - 7/21/2021  Medicare offers a range of preventive health benefits. Some of the tests and screenings are paid in full while other may be subject to a deductible, co-insurance, and/or copay. Some of these benefits include a comprehensive review of your medical history including lifestyle, illnesses that may run in your family, and various assessments and screenings as appropriate. After reviewing your medical record and screening and assessments performed today your provider may have ordered immunizations, labs, imaging, and/or referrals for you. A list of these orders (if applicable) as well as your Preventive Care list are included within your After Visit Summary for your review. Other Preventive Recommendations:    · A preventive eye exam performed by an eye specialist is recommended every 1-2 years to screen for glaucoma; cataracts, macular degeneration, and other eye disorders. · A preventive dental visit is recommended every 6 months. · Try to get at least 150 minutes of exercise per week or 10,000 steps per day on a pedometer . · Order or download the FREE \"Exercise & Physical Activity: Your Everyday Guide\" from [employer]. Call 5-279.466.9720 or search [employer] online. · You need 4073-5113 mg of calcium and 9965-7319 IU of vitamin D per day. It is possible to meet your calcium requirement with diet alone, but a vitamin D supplement is usually necessary to meet this goal.  · When exposed to the sun, use a sunscreen that protects against both UVA and UVB radiation with an SPF of 30 or greater.  Reapply every 2 to 3 hours or after sweating, drying off with a towel, or swimming. · Always wear a seat belt when traveling in a car. Always wear a helmet when riding a bicycle or motorcycle. Orders Placed This Encounter   Procedures    TSH without Reflex     Standing Status:   Future     Standing Expiration Date:   2022    Electrolyte Panel     Standing Status:   Future     Standing Expiration Date:   2022    Creatinine, Serum     Standing Status:   Future     Standing Expiration Date:   2022    ELECTROLYTES    Creatinine    TSH without Reflex     Orders Placed This Encounter   Medications    levothyroxine (SYNTHROID) 125 MCG tablet     Sig: Take 1 tablet by mouth Daily     Dispense:  90 tablet     Refill:  1        Return in 3 months (on 10/21/2021) for HTN, hypothyroid, lipid; Medicare Annual Wellness Visit in 1 year. Discussed use, benefit, and side effects of prescribed medications. All patient questions answered. Pt voiced understanding. . Patient agreed with treatment plan. Follow up as directed. Electronically signedby Belinda Oliveros MD on 2021         Medicare Annual Wellness Visit  Name: Sayda Stoddard Date: 2021   MRN: [id number] Sex: Female   Age: 80 y.o. Ethnicity: Non- / Non    : 1933 Race: White (non-)      Ellamae Holter is here for Medicare AWV    Screenings for behavioral, psychosocial and functional/safety risks, and cognitive dysfunction are all negative except as indicated below. These results, as well as other patient data from the [address] form, are documented in Flowsheets linked to this Encounter. Allergies   Allergen Reactions    Celebrex [Celecoxib] Nausea Only     Upset stomach         Prior to Visit Medications    Medication Sig Taking?  Authorizing Provider   levothyroxine (SYNTHROID) 125 MCG tablet Take 1 tablet by mouth Daily Yes Belinda Oliveros MD   lisinopril-hydroCHLOROthiazide (PRINZIDE;ZESTORETIC) 20-12.5 MG per tablet Take 1 tablet by mouth upon direct observation of the patient, evaluation of cognition reveals recent and remote memory intact. Patient's complete Health Risk Assessment and screening values have been reviewed and are found in Flowsheets. The following problems were reviewed today and where indicated follow up appointments were made and/or referrals ordered. Positive Risk Factor Screenings with Interventions:      Cognitive:  Clock Drawing Test (CDT) Score: (!) Abnormal  Total Score Interpretation: Positive Mini-Cog  Did the patient refuse to take the cognition test?: No  Cognitive Impairment Interventions:  · stable baseline         General Health and ACP:  General  In general, how would you say your health is?: Very Good  In the past 7 days, have you experienced any of the following?  New or Increased Pain, New or Increased Fatigue, Loneliness, Social Isolation, Stress or Anger?: (!) Social Isolation  Do you get the social and emotional support that you need?: Yes  Do you have a Living Will?: Yes  Advance Directives     Power of [address] [place] [address] Will ACP-Advance Directive ACP-Power of     Not on File Not on File Not on File Not on File      General Health Risk Interventions:  · copy of medical power of  to office    Health Habits/Nutrition:  Health Habits/Nutrition  Do you exercise for at least 20 minutes 2-3 times per week?: (!) No  Have you lost any weight without trying in the past 3 months?: No  Do you eat only one meal per day?: No  Have you seen the dentist within the past year?: (!) No  Body mass index: (!) 34.96  Health Habits/Nutrition Interventions:  · Inadequate physical activity:  patient is not ready to increase his/her physical activity level at this time, with news reports on covid  · Dental exam overdue:  full dentures- not worn    Hearing/Vision:  No exam data present  Hearing/Vision  Do you or your family notice any trouble with your hearing that hasn't been managed with hearing aids?: (!) Yes  Do you have difficulty driving, watching TV, or doing any of your daily activities because of your eyesight?: No  Have you had an eye exam within the past year?: Yes  Hearing/Vision Interventions:  · Hearing concerns:  patient declines any further evaluation/treatment for hearing issues    Safety:  Safety  Do you have working smoke detectors?: Yes  Have all throw rugs been removed or fastened?: (!) No  Do you have non-slip mats or surfaces in all bathtubs/showers?: Yes  Do all of your stairways have a railing or banister?: Yes  Are your doorways, halls and stairs free of clutter?: Yes  Do you always fasten your seatbelt when you are in a car?: Yes  Safety Interventions:  · Home safety tips provided     Personalized Preventive Plan   Current Health Maintenance Status  Immunization History   Administered Date(s) Administered    COVID-19, Moderna, PF, 100mcg/0.5mL 02/05/2021, 02/26/2021    Influenza, High Dose (Fluzone 65 yrs and older) 10/24/2017, 10/09/2018    Influenza, Quadv, adjuvanted, 65 yrs +, IM, PF (Fluad) 10/02/2020    Influenza, Triv, inactivated, subunit, adjuvanted, IM (Fluad 65 yrs and older) 10/02/2018, 10/22/2019    Pneumococcal Conjugate 13-valent ([id number]) 08/04/2015    Pneumococcal Polysaccharide ([id number]) 05/12/2010    Tdap (Boostrix, Adacel) 10/02/2018    Zoster Recombinant (Shingrix) 04/25/2018, 11/27/2018        Health Maintenance   Topic Date Due    Annual Wellness Visit (AWV)  Never done    Flu vaccine (1) 09/01/2021    Lipid screen  01/02/2022    TSH testing  07/21/2022    Potassium monitoring  07/21/2022    Creatinine monitoring  07/21/2022    DTaP/Tdap/Td vaccine (2 - Td or Tdap) 10/02/2028    Shingles Vaccine  Completed    Pneumococcal 65+ yrs at Risk Vaccine  Completed    COVID-19 Vaccine  Completed    Hepatitis A vaccine  Aged Out    Hepatitis B vaccine  Aged Out    Hib vaccine  Aged Out    Meningococcal (ACWY) vaccine  Aged Out     Recommendations for Preventive Services Due: see orders and patient instructions/AVS.  . Recommended screening schedule for the next 5-10 years is provided to the patient in written form: see Patient Randy Pringle was seen today for medicare aw. Diagnoses and all orders for this visit:    Routine general medical examination at a health care facility    Hyponatremia  -     Electrolyte Panel; Future    CLL (chronic lymphocytic leukemia) (HCC)    Acquired hypothyroidism  -     TSH without Reflex; Future  -     levothyroxine (SYNTHROID) 125 MCG tablet; Take 1 tablet by mouth Daily    BMI 34.0-34.9,adult    Essential hypertension  -     Creatinine, Serum;  Future    Full dentures    Other orders  -     ELECTROLYTES  -     Creatinine  -     TSH without Reflex

## 2021-08-12 DIAGNOSIS — I10 ESSENTIAL HYPERTENSION: ICD-10-CM

## 2021-08-12 RX ORDER — LISINOPRIL AND HYDROCHLOROTHIAZIDE 20; 12.5 MG/1; MG/1
1 TABLET ORAL EVERY MORNING
Qty: 90 TABLET | Refills: 1 | Status: SHIPPED | OUTPATIENT
Start: 2021-08-12 | End: 2021-09-02 | Stop reason: ALTCHOICE

## 2021-08-12 NOTE — TELEPHONE ENCOUNTER
Meenakshi Kinney is requesting a refill on the following medication(s):  Requested Prescriptions     Pending Prescriptions Disp Refills    lisinopril-hydroCHLOROthiazide (PRINZIDE;ZESTORETIC) 20-12.5 MG per tablet 30 tablet 3     Sig: Take 1 tablet by mouth every morning       Last Visit Date (If Applicable):  2/1/5705    Next Visit Date:    Visit date not found

## 2021-08-25 ENCOUNTER — TELEPHONE (OUTPATIENT)
Dept: FAMILY MEDICINE CLINIC | Age: 86
End: 2021-08-25

## 2021-08-25 NOTE — TELEPHONE ENCOUNTER
If patient is symptomatic may have evaluation in ER or urgent care. Prior history of pulses in 50's so would avoid medication adjustment without evaluation. Encourage pt to monitor Blood pressure and pulses at home and if persisting over 100 then to come for evaluation and discussion. May need to consider cardiology evaluation if tachycardia persisting. Please ensure pt has been taking diltiazem/cartia and levothyroxine regularly.   Recent lab including thyroid was normal in past month  Thanks

## 2021-08-25 NOTE — TELEPHONE ENCOUNTER
Pt went to IV IG infusion and HR was in 140's, [facility] called and asked what we would like to do after the apt.  Dr. Cynthia Robles was also notified

## 2021-08-25 NOTE — TELEPHONE ENCOUNTER
Pt going to continue to monitor bp and hr at home, says the past week it has been over 100, sometimes 101 sometimes 114 but never in the 140s

## 2021-09-02 ENCOUNTER — OFFICE VISIT (OUTPATIENT)
Dept: FAMILY MEDICINE CLINIC | Age: 86
End: 2021-09-02
Payer: MEDICARE

## 2021-09-02 ENCOUNTER — HOSPITAL ENCOUNTER (OUTPATIENT)
Age: 86
Setting detail: SPECIMEN
Discharge: HOME | End: 2021-09-02
Payer: MEDICARE

## 2021-09-02 VITALS
OXYGEN SATURATION: 97 % | DIASTOLIC BLOOD PRESSURE: 60 MMHG | WEIGHT: 130.6 LBS | HEART RATE: 102 BPM | SYSTOLIC BLOOD PRESSURE: 98 MMHG | RESPIRATION RATE: 20 BRPM | HEIGHT: 57 IN | BODY MASS INDEX: 28.18 KG/M2 | TEMPERATURE: 98.1 F

## 2021-09-02 DIAGNOSIS — I95.0 IDIOPATHIC HYPOTENSION: Primary | ICD-10-CM

## 2021-09-02 DIAGNOSIS — N30.01 ACUTE CYSTITIS WITH HEMATURIA: Primary | ICD-10-CM

## 2021-09-02 DIAGNOSIS — R26.81 UNSTEADY GAIT: ICD-10-CM

## 2021-09-02 DIAGNOSIS — E03.9 ACQUIRED HYPOTHYROIDISM: ICD-10-CM

## 2021-09-02 DIAGNOSIS — N17.9 ACUTE RENAL FAILURE, UNSPECIFIED ACUTE RENAL FAILURE TYPE (HCC): ICD-10-CM

## 2021-09-02 DIAGNOSIS — I10 ESSENTIAL HYPERTENSION: ICD-10-CM

## 2021-09-02 DIAGNOSIS — E87.1 HYPONATREMIA: ICD-10-CM

## 2021-09-02 LAB
ANION GAP SERPL CALCULATED.3IONS-SCNC: 19 MMOL/L (ref 9–17)
BACTERIA, URINE: ABNORMAL
BILIRUBIN URINE: NEGATIVE
BLOOD, URINE: ABNORMAL
CASTS UA: ABNORMAL
CHLORIDE BLD-SCNC: 88 MMOL/L (ref 98–107)
CLARITY: ABNORMAL
CO2: 18 MMOL/L (ref 20–31)
COLOR, URINE: YELLOW
CREAT SERPL-MCNC: 1.61 MG/DL (ref 0.5–0.9)
CRYSTALS, UA: ABNORMAL
GFR AFRICAN AMERICAN: 37 ML/MIN
GFR NON-AFRICAN AMERICAN: 30 ML/MIN
GFR SERPL CREATININE-BSD FRML MDRD: ABNORMAL ML/MIN/{1.73_M2}
GFR SERPL CREATININE-BSD FRML MDRD: ABNORMAL ML/MIN/{1.73_M2}
GLUCOSE URINE: NEGATIVE MG/DL
KETONES, URINE: NEGATIVE MG/DL
LEUKOCYTE ESTERASE, URINE: ABNORMAL
MUCUS, URINE: ABNORMAL
NITRITE, URINE: NEGATIVE
PH UA: 6 (ref 5–8.5)
POTASSIUM SERPL-SCNC: 4.3 MMOL/L (ref 3.7–5.3)
PROTEIN UA: NEGATIVE MG/DL
RBC URINE: ABNORMAL (ref 0–2)
SODIUM BLD-SCNC: 125 MMOL/L (ref 135–144)
SPECIFIC GRAVITY, URINE: 1.02 MG/DL (ref 1–1.03)
SQUAMOUS EPITHELIAL: ABNORMAL
TRICHOMONAS, URINE: ABNORMAL
TSH SERPL DL<=0.05 MIU/L-ACNC: 2.06 MIU/L (ref 0.3–5)
UROBILINOGEN, URINE: 0.2 MG/DL (ref 0.2–1)
WBC URINE: ABNORMAL (ref 0–4)
YEAST, URINE: ABNORMAL

## 2021-09-02 PROCEDURE — G8417 CALC BMI ABV UP PARAM F/U: HCPCS | Performed by: FAMILY MEDICINE

## 2021-09-02 PROCEDURE — 36415 COLL VENOUS BLD VENIPUNCTURE: CPT | Performed by: FAMILY MEDICINE

## 2021-09-02 PROCEDURE — 1036F TOBACCO NON-USER: CPT | Performed by: FAMILY MEDICINE

## 2021-09-02 PROCEDURE — G8427 DOCREV CUR MEDS BY ELIG CLIN: HCPCS | Performed by: FAMILY MEDICINE

## 2021-09-02 PROCEDURE — 4040F PNEUMOC VAC/ADMIN/RCVD: CPT | Performed by: FAMILY MEDICINE

## 2021-09-02 PROCEDURE — 84443 ASSAY THYROID STIM HORMONE: CPT | Performed by: FAMILY MEDICINE

## 2021-09-02 PROCEDURE — 84443 ASSAY THYROID STIM HORMONE: CPT

## 2021-09-02 PROCEDURE — 1090F PRES/ABSN URINE INCON ASSESS: CPT | Performed by: FAMILY MEDICINE

## 2021-09-02 PROCEDURE — 99213 OFFICE O/P EST LOW 20 MIN: CPT | Performed by: FAMILY MEDICINE

## 2021-09-02 PROCEDURE — 99214 OFFICE O/P EST MOD 30 MIN: CPT | Performed by: FAMILY MEDICINE

## 2021-09-02 PROCEDURE — 82565 ASSAY OF CREATININE: CPT

## 2021-09-02 PROCEDURE — 80051 ELECTROLYTE PANEL: CPT

## 2021-09-02 PROCEDURE — 85025 COMPLETE CBC W/AUTO DIFF WBC: CPT

## 2021-09-02 PROCEDURE — 1123F ACP DISCUSS/DSCN MKR DOCD: CPT | Performed by: FAMILY MEDICINE

## 2021-09-02 PROCEDURE — 85025 COMPLETE CBC W/AUTO DIFF WBC: CPT | Performed by: FAMILY MEDICINE

## 2021-09-02 RX ORDER — AMOXICILLIN 500 MG/1
500 CAPSULE ORAL 3 TIMES DAILY
Qty: 30 CAPSULE | Refills: 0 | Status: SHIPPED | OUTPATIENT
Start: 2021-09-02 | End: 2021-09-12

## 2021-09-02 RX ORDER — LISINOPRIL 10 MG/1
10 TABLET ORAL DAILY
Qty: 30 TABLET | Refills: 3 | Status: SHIPPED | OUTPATIENT
Start: 2021-09-02

## 2021-09-02 RX ORDER — LEVOTHYROXINE SODIUM 0.12 MG/1
125 TABLET ORAL DAILY
Qty: 90 TABLET | Refills: 1 | Status: SHIPPED | OUTPATIENT
Start: 2021-09-02

## 2021-09-02 SDOH — ECONOMIC STABILITY: FOOD INSECURITY: WITHIN THE PAST 12 MONTHS, YOU WORRIED THAT YOUR FOOD WOULD RUN OUT BEFORE YOU GOT MONEY TO BUY MORE.: NEVER TRUE

## 2021-09-02 SDOH — ECONOMIC STABILITY: FOOD INSECURITY: WITHIN THE PAST 12 MONTHS, THE FOOD YOU BOUGHT JUST DIDN'T LAST AND YOU DIDN'T HAVE MONEY TO GET MORE.: NEVER TRUE

## 2021-09-02 ASSESSMENT — PATIENT HEALTH QUESTIONNAIRE - PHQ9
2. FEELING DOWN, DEPRESSED OR HOPELESS: 0
SUM OF ALL RESPONSES TO PHQ QUESTIONS 1-9: 1
SUM OF ALL RESPONSES TO PHQ9 QUESTIONS 1 & 2: 1
SUM OF ALL RESPONSES TO PHQ QUESTIONS 1-9: 1
1. LITTLE INTEREST OR PLEASURE IN DOING THINGS: 1
SUM OF ALL RESPONSES TO PHQ QUESTIONS 1-9: 1

## 2021-09-02 ASSESSMENT — ENCOUNTER SYMPTOMS
DIARRHEA: 1
BACK PAIN: 1
COUGH: 1
CONSTIPATION: 0
SHORTNESS OF BREATH: 0

## 2021-09-02 ASSESSMENT — SOCIAL DETERMINANTS OF HEALTH (SDOH): HOW HARD IS IT FOR YOU TO PAY FOR THE VERY BASICS LIKE FOOD, HOUSING, MEDICAL CARE, AND HEATING?: NOT HARD AT ALL

## 2021-09-02 NOTE — PROGRESS NOTES
[address] [address]  Dept: [phone]  Dept Fax: [phone]    Brenden Carter is a 80 y.o. female who presents today for her medical conditions/complaints as noted below. Paulina A Holly c/o of Gait Problem (Slurring speech, bowel and bladder incontinence. Acting differently, Back pain with activity. Concern it might be UTI- pt unable to void. )      HPI:     HPI   Feeling unsteady on feet for couple. Pt brought in with daughter noting change in speech with slurring, having bowel and bladder incontinence. Not currently able to void. No fevers noted. Having some back pains with activity    No fever, nausea or vomitting noted. Daughter reports not acting the same for     Undergoing chemo treatments for CLL affecting skin    Urinating 3-4 times more at night per pt      BP Readings from Last 3 Encounters:   09/02/21 98/60   07/21/21 130/70   05/05/21 120/74          (goal 120/80)    Past Medical History:   Diagnosis Date    Osteopenia       Past Surgical History:   Procedure Laterality Date    CATARACT REMOVAL      GALLBLADDER SURGERY      HYSTERECTOMY, VAGINAL         Family History   Problem Relation Age of Onset    Stroke Mother     Stroke Father        Social History     Tobacco Use    Smoking status: Never Smoker    Smokeless tobacco: Never Used   Substance Use Topics    Alcohol use: No      Prior to Visit Medications    Medication Sig Taking?  Authorizing Provider   lisinopril-hydroCHLOROthiazide (PRINZIDE;ZESTORETIC) 20-12.5 MG per tablet Take 1 tablet by mouth every morning Yes Clayton Castleman, MD   levothyroxine (SYNTHROID) 125 MCG tablet Take 1 tablet by mouth Daily Yes Clayton Castleman, MD   dilTIAZem (CARTIA XT) 240 MG extended release capsule Take 1 capsule by mouth daily Yes Clayton Castleman, MD   omeprazole (PRILOSEC) 20 MG delayed release capsule Take 1 capsule by mouth once daily Yes Clayton Castleman, MD   simvastatin (ZOCOR) 40 MG tablet TAKE 1 TABLET NIGHTLY Yes Dolly Fermin MD   IMBRUVICA 420 MG tablet TAKE ONE TABLET BY MOUTH ONCE DAILY. MAY BE TAKEN WITH OR WITHOUT FOOD. DO NOT CRUSH OR CHEW TABLETS. Yes Historical Provider, MD   sulfamethoxazole-trimethoprim (BACTRIM DS;SEPTRA DS) 800-160 MG per tablet TAKE 1 TABLET BY MOUTH ON MONDAY [facility] AND FRIDAY Yes Historical Provider, MD   acyclovir (ZOVIRAX) 400 MG tablet  Yes Historical Provider, MD   Inulin (FIBER CHOICE PO) Take by mouth Yes Historical Provider, MD   Multiple Vitamins-Minerals (MULTIVITAMIN ADULT PO) Take by mouth 2 times daily Yes Historical Provider, MD   docusate sodium (DULCOLAX) 100 MG capsule Take 100 mg by mouth daily Yes Historical Provider, MD   Calcium Carb-Cholecalciferol (CALTRATE 600+D) 600-800 MG-UNIT TABS Take by mouth 2 times daily Yes Historical Provider, MD     Allergies   Allergen Reactions    Celebrex [Celecoxib] Nausea Only     Upset stomach       Health Maintenance   Topic Date Due    COVID-19 Vaccine (3 - Inadvertent mRNA risk 3-dose series) 03/26/2021    Flu vaccine (1) 09/01/2021    Lipid screen  01/02/2022    TSH testing  07/21/2022    Annual Wellness Visit (AWV)  07/22/2022    Potassium monitoring  08/11/2022    Creatinine monitoring  08/11/2022    DTaP/Tdap/Td vaccine (2 - Td or Tdap) 10/02/2028    Shingles Vaccine  Completed    Pneumococcal 65+ yrs at Risk Vaccine  Completed    Hepatitis A vaccine  Aged Out    Hepatitis B vaccine  Aged Out    Hib vaccine  Aged Out    Meningococcal (ACWY) vaccine  Aged Out       Subjective:      Review of Systems   Constitutional: Positive for appetite change and fatigue. HENT: Negative for congestion. Eyes: Positive for visual disturbance. Respiratory: Positive for cough. Negative for shortness of breath. Cardiovascular: Negative for chest pain. Gastrointestinal: Positive for diarrhea. Negative for constipation. Genitourinary: Negative for dysuria and frequency. Incontinence   Musculoskeletal: Positive for back pain. Skin:        Chemo treatments affecting skin   Neurological: Positive for dizziness. Negative for headaches. Psychiatric/Behavioral: Positive for confusion. Objective:     BP 98/60 (Site: Right Upper Arm, Position: Sitting, Cuff Size: Medium Adult)   Pulse 102   Temp 98.1 °F (36.7 °C)   Resp 20   Ht 4' 9\" (1.448 m)   Wt 130 lb 9.6 oz (59.2 kg)   SpO2 97%   BMI 28.26 kg/m²     Physical Exam  Vitals reviewed. Constitutional:       General: She is not in acute distress. Appearance: She is well-developed. Comments: thin   HENT:      Head: Atraumatic. Neck:      Thyroid: No thyromegaly. Vascular: No carotid bruit. Cardiovascular:      Rate and Rhythm: Normal rate and regular rhythm. Heart sounds: No murmur heard. Pulmonary:      Effort: Pulmonary effort is normal.      Breath sounds: Normal breath sounds. Abdominal:      General: Bowel sounds are normal.      Palpations: Abdomen is soft. Tenderness: There is no right CVA tenderness or left CVA tenderness. Musculoskeletal:         General: No swelling (BLE). Cervical back: Neck supple. Neurological:      Mental Status: She is alert and oriented to person, place, and time. Psychiatric:      Comments: Pt mildly anxious     pt anxious with BP's    Weight down 15 # from prior  Decreased appetite noted    Assessment:     1. Idiopathic hypotension    2. Essential hypertension    3. Hyponatremia    4. Acquired hypothyroidism    5. BMI 28.0-28.9,adult    6. Unsteady gait      No results found for this visit on 09/02/21. Plan:   No orders of the defined types were placed in this encounter. Return in about 13 days (around 9/15/2021) for hypotension, gait disturbance. Patient was seen with total face to face time of 35 minutes.  More than 50%   of this visit was counseling and education regarding complex care as well   as counseling on need for specialty follow-up given severity of symptoms. Patient Instructions   Likely over medicated for HTN with significant weight loss and need to monitor thyroid levels  Obtain urine for evaluation of infection. Encourage to have BP checked by staff when in for treatment. Water 4 large bottles daily       Discussed use, benefit, and side effects of prescribed medications. All patient questions answered. Pt voiced understanding. Patient agreed with treatment plan. Follow up as directed.      Electronically signed by Bashir Whelan MD on 9/2/2021

## 2021-09-02 NOTE — PATIENT INSTRUCTIONS
Likely over medicated for HTN with significant weight loss and need to monitor thyroid levels  Obtain urine for evaluation of infection. Encourage to have BP checked by staff when in for treatment.   Water 4 large bottles daily

## 2021-09-02 NOTE — TELEPHONE ENCOUNTER
Meenakshi Kinney is requesting a refill on the following medication(s):  Requested Prescriptions     Pending Prescriptions Disp Refills    levothyroxine (SYNTHROID) 125 MCG tablet 90 tablet 1     Sig: Take 1 tablet by mouth Daily       Last Visit Date (If Applicable):  9/9/6442    Next Visit Date:    9/28/2021

## 2021-09-07 ENCOUNTER — TELEPHONE (OUTPATIENT)
Dept: NEPHROLOGY | Age: 86
End: 2021-09-07

## 2021-09-07 LAB
ABSOLUTE EOS #: 0 K/UL (ref 0–0.4)
ABSOLUTE IMMATURE GRANULOCYTE: 0.12 K/UL (ref 0–0.3)
ABSOLUTE LYMPH #: 0.62 K/UL (ref 1–4.8)
ABSOLUTE MONO #: 0.86 K/UL (ref 0.1–0.8)
BASOPHILS # BLD: 0 % (ref 0–2)
BASOPHILS ABSOLUTE: 0 K/UL (ref 0–0.2)
DIFFERENTIAL TYPE: ABNORMAL
EOSINOPHILS RELATIVE PERCENT: 0 % (ref 1–4)
HCT VFR BLD CALC: 36.8 % (ref 36.3–47.1)
HEMOGLOBIN: 11.9 G/DL (ref 11.9–15.1)
IMMATURE GRANULOCYTES: 1 %
LYMPHOCYTES # BLD: 5 % (ref 24–44)
MCH RBC QN AUTO: 29.2 PG (ref 25.2–33.5)
MCHC RBC AUTO-ENTMCNC: 32.3 G/DL (ref 25.2–33.5)
MCV RBC AUTO: 90.2 FL (ref 82.6–102.9)
MONOCYTES # BLD: 7 % (ref 1–7)
MORPHOLOGY: NORMAL
NRBC AUTOMATED: 0 PER 100 WBC
PDW BLD-RTO: 13.3 % (ref 11.8–14.4)
PLATELET # BLD: 399 K/UL (ref 138–453)
PLATELET ESTIMATE: ABNORMAL
PMV BLD AUTO: 10.8 FL (ref 8.1–13.5)
RBC # BLD: 4.08 M/UL (ref 3.95–5.11)
RBC # BLD: ABNORMAL 10*6/UL
SEG NEUTROPHILS: 87 % (ref 36–66)
SEGMENTED NEUTROPHILS ABSOLUTE COUNT: 10.7 K/UL (ref 1.8–7.7)
WBC # BLD: 12.3 K/UL (ref 3.5–11.3)
WBC # BLD: ABNORMAL 10*3/UL

## 2021-09-07 NOTE — TELEPHONE ENCOUNTER
9/7/2021 spoke with patient's daughter, Grazyna Eubanks. States she would like her mother scheduled with Nephrologist when they go to [facility] to see patients. Call patient first to schedule at [facility] clinic [phone]. If you have any questions call [facility] at [phone].     RECORDS FAXED TO [facility] to contact patient to be scheduled at [facility]